# Patient Record
Sex: FEMALE | Race: WHITE | NOT HISPANIC OR LATINO | Employment: OTHER | URBAN - NONMETROPOLITAN AREA
[De-identification: names, ages, dates, MRNs, and addresses within clinical notes are randomized per-mention and may not be internally consistent; named-entity substitution may affect disease eponyms.]

---

## 2024-02-12 ENCOUNTER — APPOINTMENT (OUTPATIENT)
Dept: GENERAL RADIOLOGY | Facility: HOSPITAL | Age: 69
End: 2024-02-12
Payer: COMMERCIAL

## 2024-02-12 ENCOUNTER — HOSPITAL ENCOUNTER (EMERGENCY)
Facility: HOSPITAL | Age: 69
Discharge: HOME | End: 2024-02-12
Attending: STUDENT IN AN ORGANIZED HEALTH CARE EDUCATION/TRAINING PROGRAM | Admitting: STUDENT IN AN ORGANIZED HEALTH CARE EDUCATION/TRAINING PROGRAM
Payer: COMMERCIAL

## 2024-02-12 VITALS
BODY MASS INDEX: 30.36 KG/M2 | RESPIRATION RATE: 17 BRPM | WEIGHT: 165 LBS | HEART RATE: 75 BPM | SYSTOLIC BLOOD PRESSURE: 140 MMHG | DIASTOLIC BLOOD PRESSURE: 88 MMHG | OXYGEN SATURATION: 98 % | HEIGHT: 62 IN | TEMPERATURE: 97.8 F

## 2024-02-12 DIAGNOSIS — I10 HYPERTENSION, UNSPECIFIED TYPE: Primary | ICD-10-CM

## 2024-02-12 DIAGNOSIS — E03.9 HYPOTHYROIDISM, UNSPECIFIED TYPE: ICD-10-CM

## 2024-02-12 LAB
ALBUMIN SERPL-MCNC: 4.6 G/DL (ref 3.5–5.2)
ALBUMIN/GLOB SERPL: 1.3 G/DL
ALP SERPL-CCNC: 78 U/L (ref 39–117)
ALT SERPL W P-5'-P-CCNC: 20 U/L (ref 1–33)
ANION GAP SERPL CALCULATED.3IONS-SCNC: 22 MMOL/L (ref 5–15)
AST SERPL-CCNC: 27 U/L (ref 1–32)
BASOPHILS # BLD AUTO: 0.04 10*3/MM3 (ref 0–0.2)
BASOPHILS NFR BLD AUTO: 0.5 % (ref 0–1.5)
BILIRUB SERPL-MCNC: 0.7 MG/DL (ref 0–1.2)
BUN SERPL-MCNC: 13 MG/DL (ref 8–23)
BUN/CREAT SERPL: 12.4 (ref 7–25)
CALCIUM SPEC-SCNC: 9.7 MG/DL (ref 8.6–10.5)
CHLORIDE SERPL-SCNC: 97 MMOL/L (ref 98–107)
CO2 SERPL-SCNC: 19 MMOL/L (ref 22–29)
CREAT SERPL-MCNC: 1.05 MG/DL (ref 0.57–1)
DEPRECATED RDW RBC AUTO: 49.9 FL (ref 37–54)
EGFRCR SERPLBLD CKD-EPI 2021: 57.3 ML/MIN/1.73
EOSINOPHIL # BLD AUTO: 0.06 10*3/MM3 (ref 0–0.4)
EOSINOPHIL NFR BLD AUTO: 0.8 % (ref 0.3–6.2)
ERYTHROCYTE [DISTWIDTH] IN BLOOD BY AUTOMATED COUNT: 14.7 % (ref 12.3–15.4)
GEN 5 2HR TROPONIN T REFLEX: 11 NG/L
GLOBULIN UR ELPH-MCNC: 3.5 GM/DL
GLUCOSE SERPL-MCNC: 93 MG/DL (ref 65–99)
HCT VFR BLD AUTO: 44 % (ref 34–46.6)
HGB BLD-MCNC: 14.6 G/DL (ref 12–15.9)
HOLD SPECIMEN: NORMAL
HOLD SPECIMEN: NORMAL
IMM GRANULOCYTES # BLD AUTO: 0.02 10*3/MM3 (ref 0–0.05)
IMM GRANULOCYTES NFR BLD AUTO: 0.3 % (ref 0–0.5)
LYMPHOCYTES # BLD AUTO: 1.36 10*3/MM3 (ref 0.7–3.1)
LYMPHOCYTES NFR BLD AUTO: 17.3 % (ref 19.6–45.3)
MCH RBC QN AUTO: 30.4 PG (ref 26.6–33)
MCHC RBC AUTO-ENTMCNC: 33.2 G/DL (ref 31.5–35.7)
MCV RBC AUTO: 91.5 FL (ref 79–97)
MONOCYTES # BLD AUTO: 0.67 10*3/MM3 (ref 0.1–0.9)
MONOCYTES NFR BLD AUTO: 8.5 % (ref 5–12)
NEUTROPHILS NFR BLD AUTO: 5.73 10*3/MM3 (ref 1.7–7)
NEUTROPHILS NFR BLD AUTO: 72.6 % (ref 42.7–76)
NRBC BLD AUTO-RTO: 0 /100 WBC (ref 0–0.2)
PLATELET # BLD AUTO: 370 10*3/MM3 (ref 140–450)
PMV BLD AUTO: 9.5 FL (ref 6–12)
POTASSIUM SERPL-SCNC: 4.2 MMOL/L (ref 3.5–5.2)
PROT SERPL-MCNC: 8.1 G/DL (ref 6–8.5)
RBC # BLD AUTO: 4.81 10*6/MM3 (ref 3.77–5.28)
SODIUM SERPL-SCNC: 138 MMOL/L (ref 136–145)
T4 FREE SERPL-MCNC: 2.28 NG/DL (ref 0.93–1.7)
TROPONIN T DELTA: 0 NG/L
TROPONIN T SERPL HS-MCNC: 11 NG/L
TSH SERPL DL<=0.05 MIU/L-ACNC: 4.32 UIU/ML (ref 0.27–4.2)
WBC NRBC COR # BLD AUTO: 7.88 10*3/MM3 (ref 3.4–10.8)
WHOLE BLOOD HOLD COAG: NORMAL
WHOLE BLOOD HOLD SPECIMEN: NORMAL

## 2024-02-12 PROCEDURE — 80053 COMPREHEN METABOLIC PANEL: CPT | Performed by: STUDENT IN AN ORGANIZED HEALTH CARE EDUCATION/TRAINING PROGRAM

## 2024-02-12 PROCEDURE — 36415 COLL VENOUS BLD VENIPUNCTURE: CPT

## 2024-02-12 PROCEDURE — 93005 ELECTROCARDIOGRAM TRACING: CPT | Performed by: STUDENT IN AN ORGANIZED HEALTH CARE EDUCATION/TRAINING PROGRAM

## 2024-02-12 PROCEDURE — 99284 EMERGENCY DEPT VISIT MOD MDM: CPT

## 2024-02-12 PROCEDURE — 84439 ASSAY OF FREE THYROXINE: CPT | Performed by: PHYSICIAN ASSISTANT

## 2024-02-12 PROCEDURE — 84484 ASSAY OF TROPONIN QUANT: CPT | Performed by: STUDENT IN AN ORGANIZED HEALTH CARE EDUCATION/TRAINING PROGRAM

## 2024-02-12 PROCEDURE — 71045 X-RAY EXAM CHEST 1 VIEW: CPT

## 2024-02-12 PROCEDURE — 85025 COMPLETE CBC W/AUTO DIFF WBC: CPT | Performed by: STUDENT IN AN ORGANIZED HEALTH CARE EDUCATION/TRAINING PROGRAM

## 2024-02-12 PROCEDURE — 71045 X-RAY EXAM CHEST 1 VIEW: CPT | Performed by: RADIOLOGY

## 2024-02-12 PROCEDURE — 84443 ASSAY THYROID STIM HORMONE: CPT | Performed by: PHYSICIAN ASSISTANT

## 2024-02-12 RX ORDER — CLONIDINE HYDROCHLORIDE 0.1 MG/1
0.1 TABLET ORAL EVERY 8 HOURS SCHEDULED
Status: DISCONTINUED | OUTPATIENT
Start: 2024-02-12 | End: 2024-02-12 | Stop reason: HOSPADM

## 2024-02-12 RX ORDER — SODIUM CHLORIDE 0.9 % (FLUSH) 0.9 %
10 SYRINGE (ML) INJECTION AS NEEDED
Status: DISCONTINUED | OUTPATIENT
Start: 2024-02-12 | End: 2024-02-12 | Stop reason: HOSPADM

## 2024-02-12 RX ORDER — CLONIDINE HYDROCHLORIDE 0.1 MG/1
0.1 TABLET ORAL ONCE
Status: COMPLETED | OUTPATIENT
Start: 2024-02-12 | End: 2024-02-12

## 2024-02-12 RX ORDER — ASPIRIN 325 MG
325 TABLET ORAL ONCE
Status: DISCONTINUED | OUTPATIENT
Start: 2024-02-12 | End: 2024-02-12 | Stop reason: HOSPADM

## 2024-02-12 RX ADMIN — CLONIDINE HYDROCHLORIDE 0.1 MG: 0.1 TABLET ORAL at 18:43

## 2024-02-13 LAB
QT INTERVAL: 362 MS
QTC INTERVAL: 433 MS

## 2024-02-16 ENCOUNTER — HOSPITAL ENCOUNTER (INPATIENT)
Facility: HOSPITAL | Age: 69
LOS: 4 days | Discharge: HOME | End: 2024-02-20
Attending: STUDENT IN AN ORGANIZED HEALTH CARE EDUCATION/TRAINING PROGRAM | Admitting: INTERNAL MEDICINE
Payer: COMMERCIAL

## 2024-02-16 ENCOUNTER — APPOINTMENT (OUTPATIENT)
Dept: CARDIOLOGY | Facility: HOSPITAL | Age: 69
End: 2024-02-16
Payer: COMMERCIAL

## 2024-02-16 DIAGNOSIS — I47.10 SVT (SUPRAVENTRICULAR TACHYCARDIA): Primary | ICD-10-CM

## 2024-02-16 LAB
ANION GAP SERPL CALCULATED.3IONS-SCNC: 16.2 MMOL/L (ref 5–15)
BASOPHILS # BLD AUTO: 0.06 10*3/MM3 (ref 0–0.2)
BASOPHILS NFR BLD AUTO: 0.6 % (ref 0–1.5)
BUN SERPL-MCNC: 15 MG/DL (ref 8–23)
BUN/CREAT SERPL: 17.2 (ref 7–25)
CALCIUM SPEC-SCNC: 10.3 MG/DL (ref 8.6–10.5)
CHLORIDE SERPL-SCNC: 97 MMOL/L (ref 98–107)
CO2 SERPL-SCNC: 23.8 MMOL/L (ref 22–29)
CREAT SERPL-MCNC: 0.87 MG/DL (ref 0.57–1)
DEPRECATED RDW RBC AUTO: 49 FL (ref 37–54)
EGFRCR SERPLBLD CKD-EPI 2021: 71.8 ML/MIN/1.73
EOSINOPHIL # BLD AUTO: 0.08 10*3/MM3 (ref 0–0.4)
EOSINOPHIL NFR BLD AUTO: 0.8 % (ref 0.3–6.2)
ERYTHROCYTE [DISTWIDTH] IN BLOOD BY AUTOMATED COUNT: 14.7 % (ref 12.3–15.4)
GEN 5 2HR TROPONIN T REFLEX: 11 NG/L
GLUCOSE SERPL-MCNC: 122 MG/DL (ref 65–99)
HCT VFR BLD AUTO: 46.1 % (ref 34–46.6)
HGB BLD-MCNC: 15.6 G/DL (ref 12–15.9)
HOLD SPECIMEN: NORMAL
HOLD SPECIMEN: NORMAL
IMM GRANULOCYTES # BLD AUTO: 0.02 10*3/MM3 (ref 0–0.05)
IMM GRANULOCYTES NFR BLD AUTO: 0.2 % (ref 0–0.5)
LYMPHOCYTES # BLD AUTO: 2.06 10*3/MM3 (ref 0.7–3.1)
LYMPHOCYTES NFR BLD AUTO: 21.3 % (ref 19.6–45.3)
MAGNESIUM SERPL-MCNC: 2.1 MG/DL (ref 1.6–2.4)
MCH RBC QN AUTO: 30.8 PG (ref 26.6–33)
MCHC RBC AUTO-ENTMCNC: 33.8 G/DL (ref 31.5–35.7)
MCV RBC AUTO: 90.9 FL (ref 79–97)
MONOCYTES # BLD AUTO: 0.93 10*3/MM3 (ref 0.1–0.9)
MONOCYTES NFR BLD AUTO: 9.6 % (ref 5–12)
NEUTROPHILS NFR BLD AUTO: 6.52 10*3/MM3 (ref 1.7–7)
NEUTROPHILS NFR BLD AUTO: 67.5 % (ref 42.7–76)
NRBC BLD AUTO-RTO: 0 /100 WBC (ref 0–0.2)
PHOSPHATE SERPL-MCNC: 2.7 MG/DL (ref 2.5–4.5)
PLATELET # BLD AUTO: 396 10*3/MM3 (ref 140–450)
PMV BLD AUTO: 10.7 FL (ref 6–12)
POTASSIUM SERPL-SCNC: 3.8 MMOL/L (ref 3.5–5.2)
QT INTERVAL: 304 MS
QTC INTERVAL: 489 MS
RBC # BLD AUTO: 5.07 10*6/MM3 (ref 3.77–5.28)
SODIUM SERPL-SCNC: 137 MMOL/L (ref 136–145)
T4 FREE SERPL-MCNC: 2.21 NG/DL (ref 0.93–1.7)
TROPONIN T DELTA: -2 NG/L
TROPONIN T SERPL HS-MCNC: 13 NG/L
TSH SERPL DL<=0.05 MIU/L-ACNC: 4.19 UIU/ML (ref 0.27–4.2)
WBC NRBC COR # BLD AUTO: 9.67 10*3/MM3 (ref 3.4–10.8)
WHOLE BLOOD HOLD COAG: NORMAL
WHOLE BLOOD HOLD SPECIMEN: NORMAL

## 2024-02-16 PROCEDURE — 84484 ASSAY OF TROPONIN QUANT: CPT | Performed by: INTERNAL MEDICINE

## 2024-02-16 PROCEDURE — 85025 COMPLETE CBC W/AUTO DIFF WBC: CPT | Performed by: STUDENT IN AN ORGANIZED HEALTH CARE EDUCATION/TRAINING PROGRAM

## 2024-02-16 PROCEDURE — 36415 COLL VENOUS BLD VENIPUNCTURE: CPT

## 2024-02-16 PROCEDURE — 99285 EMERGENCY DEPT VISIT HI MDM: CPT

## 2024-02-16 PROCEDURE — 83735 ASSAY OF MAGNESIUM: CPT | Performed by: INTERNAL MEDICINE

## 2024-02-16 PROCEDURE — 84439 ASSAY OF FREE THYROXINE: CPT | Performed by: STUDENT IN AN ORGANIZED HEALTH CARE EDUCATION/TRAINING PROGRAM

## 2024-02-16 PROCEDURE — 84443 ASSAY THYROID STIM HORMONE: CPT | Performed by: STUDENT IN AN ORGANIZED HEALTH CARE EDUCATION/TRAINING PROGRAM

## 2024-02-16 PROCEDURE — 84100 ASSAY OF PHOSPHORUS: CPT | Performed by: INTERNAL MEDICINE

## 2024-02-16 PROCEDURE — 99223 1ST HOSP IP/OBS HIGH 75: CPT

## 2024-02-16 PROCEDURE — 80048 BASIC METABOLIC PNL TOTAL CA: CPT | Performed by: STUDENT IN AN ORGANIZED HEALTH CARE EDUCATION/TRAINING PROGRAM

## 2024-02-16 PROCEDURE — G0378 HOSPITAL OBSERVATION PER HR: HCPCS

## 2024-02-16 PROCEDURE — 93005 ELECTROCARDIOGRAM TRACING: CPT | Performed by: STUDENT IN AN ORGANIZED HEALTH CARE EDUCATION/TRAINING PROGRAM

## 2024-02-16 PROCEDURE — 25810000003 LACTATED RINGERS SOLUTION: Performed by: STUDENT IN AN ORGANIZED HEALTH CARE EDUCATION/TRAINING PROGRAM

## 2024-02-16 PROCEDURE — 93010 ELECTROCARDIOGRAM REPORT: CPT | Performed by: INTERNAL MEDICINE

## 2024-02-16 RX ORDER — POLYETHYLENE GLYCOL 3350 17 G/17G
17 POWDER, FOR SOLUTION ORAL DAILY PRN
Status: DISCONTINUED | OUTPATIENT
Start: 2024-02-16 | End: 2024-02-20 | Stop reason: HOSPADM

## 2024-02-16 RX ORDER — SODIUM CHLORIDE 9 MG/ML
40 INJECTION, SOLUTION INTRAVENOUS AS NEEDED
Status: DISCONTINUED | OUTPATIENT
Start: 2024-02-16 | End: 2024-02-20 | Stop reason: HOSPADM

## 2024-02-16 RX ORDER — AMOXICILLIN 250 MG
2 CAPSULE ORAL 2 TIMES DAILY PRN
Status: DISCONTINUED | OUTPATIENT
Start: 2024-02-16 | End: 2024-02-20 | Stop reason: HOSPADM

## 2024-02-16 RX ORDER — NITROGLYCERIN 0.4 MG/1
0.4 TABLET SUBLINGUAL
Status: DISCONTINUED | OUTPATIENT
Start: 2024-02-16 | End: 2024-02-20 | Stop reason: HOSPADM

## 2024-02-16 RX ORDER — SODIUM CHLORIDE 0.9 % (FLUSH) 0.9 %
10 SYRINGE (ML) INJECTION AS NEEDED
Status: DISCONTINUED | OUTPATIENT
Start: 2024-02-16 | End: 2024-02-20 | Stop reason: HOSPADM

## 2024-02-16 RX ORDER — METOPROLOL TARTRATE 1 MG/ML
5 INJECTION, SOLUTION INTRAVENOUS ONCE
Status: COMPLETED | OUTPATIENT
Start: 2024-02-16 | End: 2024-02-16

## 2024-02-16 RX ORDER — ACETAMINOPHEN 325 MG/1
650 TABLET ORAL EVERY 4 HOURS PRN
Status: DISCONTINUED | OUTPATIENT
Start: 2024-02-16 | End: 2024-02-20 | Stop reason: HOSPADM

## 2024-02-16 RX ORDER — BISACODYL 5 MG/1
5 TABLET, DELAYED RELEASE ORAL DAILY PRN
Status: DISCONTINUED | OUTPATIENT
Start: 2024-02-16 | End: 2024-02-20 | Stop reason: HOSPADM

## 2024-02-16 RX ORDER — LEVOTHYROXINE SODIUM 0.07 MG/1
75 TABLET ORAL
Status: DISCONTINUED | OUTPATIENT
Start: 2024-02-17 | End: 2024-02-20 | Stop reason: HOSPADM

## 2024-02-16 RX ORDER — LEVOTHYROXINE SODIUM 0.1 MG/1
100 TABLET ORAL DAILY
COMMUNITY
End: 2024-02-20 | Stop reason: HOSPADM

## 2024-02-16 RX ORDER — CALCIUM CARBONATE 500 MG/1
2 TABLET, CHEWABLE ORAL 2 TIMES DAILY PRN
Status: DISCONTINUED | OUTPATIENT
Start: 2024-02-16 | End: 2024-02-20 | Stop reason: HOSPADM

## 2024-02-16 RX ORDER — ENOXAPARIN SODIUM 100 MG/ML
40 INJECTION SUBCUTANEOUS DAILY
Status: DISCONTINUED | OUTPATIENT
Start: 2024-02-16 | End: 2024-02-20 | Stop reason: HOSPADM

## 2024-02-16 RX ORDER — BISACODYL 10 MG
10 SUPPOSITORY, RECTAL RECTAL DAILY PRN
Status: DISCONTINUED | OUTPATIENT
Start: 2024-02-16 | End: 2024-02-20 | Stop reason: HOSPADM

## 2024-02-16 RX ORDER — SODIUM CHLORIDE 0.9 % (FLUSH) 0.9 %
10 SYRINGE (ML) INJECTION EVERY 12 HOURS SCHEDULED
Status: DISCONTINUED | OUTPATIENT
Start: 2024-02-16 | End: 2024-02-20 | Stop reason: HOSPADM

## 2024-02-16 RX ORDER — DILTIAZEM HYDROCHLORIDE 5 MG/ML
10 INJECTION INTRAVENOUS ONCE
Status: COMPLETED | OUTPATIENT
Start: 2024-02-16 | End: 2024-02-16

## 2024-02-16 RX ADMIN — SODIUM CHLORIDE, POTASSIUM CHLORIDE, SODIUM LACTATE AND CALCIUM CHLORIDE 1000 ML: 600; 310; 30; 20 INJECTION, SOLUTION INTRAVENOUS at 12:09

## 2024-02-16 RX ADMIN — DILTIAZEM HYDROCHLORIDE 10 MG: 5 INJECTION, SOLUTION INTRAVENOUS at 14:25

## 2024-02-16 RX ADMIN — SODIUM CHLORIDE 5 MG/HR: 900 INJECTION, SOLUTION INTRAVENOUS at 19:13

## 2024-02-16 RX ADMIN — METOPROLOL TARTRATE 5 MG: 1 INJECTION, SOLUTION INTRAVENOUS at 13:46

## 2024-02-16 RX ADMIN — METOPROLOL TARTRATE 5 MG: 1 INJECTION, SOLUTION INTRAVENOUS at 14:05

## 2024-02-16 NOTE — H&P
[facility] Medicine Services  History & Physical    Patient Identification:  Name:  Moriah Davies  Age:  68 y.o.  Sex:  female  :  1955  MRN:  [id number]   Visit Number:  [id number]  Admit Date: 2024   Primary Care Physician:  Provider, No Known    Subjective     Chief complaint: Rapid heart rate    History of presenting illness:      Moriah Davies is a 68 y.o. female who presented for further evaluation of rapid heart rate.  She was seen and examined in the ED in no acute distress but did appear uncomfortable.  Heart rate persistently mid 120s throughout exam.  She notes palpitations began about 3 weeks ago and have become increasingly more frequent and persistent.  She states she is consistently fatigued as well.  She denies any chest pain.  She is short of breath.  She states her cardiac history is only significant for an enlarged atrium though cardiologist she was seeing at the time stated he was not concerned about this and she did not follow-up.  She has had a stress test which she thinks was normal about 5 years ago.  She states she sees a naturopathic doctor currently.  Past medical history is significant for hypothyroidism.  She notes for the past year and a half she has been trying various herbal supplements to correct her thyroid dysfunction however given limited success she did call her old PCP about 3 weeks ago and was started on Synthroid at 100 mcg daily.  She denies starting any other new medications, stimulants or supplements.    Past medical history is significant for hypothyroidism    Upon arrival to the ED, vital signs were temp 98.2, heart rate 146, respirations 20, /118, SpO2 100%.  HS troponin negative x 1.  Glucose is elevated at 122.  TSH is WNL though free T4 is elevated at 2.21.  EKG was supraventricular tachycardia on arrival.    Known Emergency Department medications received prior to my evaluation included 10 mg Cardizem, 1 L LR bolus, 5 mg  IV Lopressor x 2.   Emergency Department Room location at the time of my evaluation was 102.     ---------------------------------------------------------------------------------------------------------------------   Review of Systems   Constitutional:  Positive for fatigue. Negative for chills and fever.   HENT:  Negative for congestion and rhinorrhea.    Respiratory:  Positive for shortness of breath. Negative for cough.    Cardiovascular:  Positive for palpitations. Negative for chest pain.   Gastrointestinal:  Negative for abdominal distention, diarrhea, nausea and vomiting.   Genitourinary:  Negative for difficulty urinating and dysuria.   Musculoskeletal:  Negative for arthralgias and myalgias.   Skin:  Negative for rash and wound.   Neurological:  Negative for dizziness and light-headedness.        ---------------------------------------------------------------------------------------------------------------------   No past medical history on file.  No past surgical history on file.  No family history on file.  Social History     Socioeconomic History    Marital status:      ---------------------------------------------------------------------------------------------------------------------   Allergies:  Patient has no known allergies.  ---------------------------------------------------------------------------------------------------------------------   Home medications:    Medications below are reported home medications pulling from within the system; at this time, these medications have not been reconciled unless otherwise specified and are in the verification process for further verifcation as current home medications.  (Not in a hospital admission)      Hospital Scheduled Meds:    No current facility-administered medications for this encounter.      Current listed hospital scheduled medications may not yet reflect those currently placed in orders that are signed and held awaiting patient's arrival  to floor.   ---------------------------------------------------------------------------------------------------------------------     Objective     Vital Signs:  Temp:  [98.2 °F (36.8 °C)] 98.2 °F (36.8 °C)  Heart Rate:  [] 126  Resp:  [20] 20  BP: (122-175)/() 140/93      02/16/24  1125   Weight: 74.8 kg (165 lb)     Body mass index is 30.18 kg/m².  ---------------------------------------------------------------------------------------------------------------------       Physical Exam  Vitals and nursing note reviewed.   Constitutional:       General: She is not in acute distress.     Appearance: She is normal weight.   HENT:      Head: Normocephalic and atraumatic.   Eyes:      Extraocular Movements: Extraocular movements intact.      Conjunctiva/sclera: Conjunctivae normal.   Cardiovascular:      Rate and Rhythm: Regular rhythm. Tachycardia present.   Pulmonary:      Effort: Pulmonary effort is normal.      Breath sounds: Normal breath sounds.   Abdominal:      Palpations: Abdomen is soft.   Musculoskeletal:      Right lower leg: No edema.      Left lower leg: No edema.   Skin:     General: Skin is warm and dry.   Neurological:      Mental Status: She is alert. Mental status is at baseline.   Psychiatric:         Mood and Affect: Mood normal.         Behavior: Behavior normal.             ---------------------------------------------------------------------------------------------------------------------  EKG:        I have personally looked at the EKG.  ---------------------------------------------------------------------------------------------------------------------   Results from last 7 days   Lab Units 02/16/24  1134 02/12/24  1637   WBC 10*3/mm3 9.67 7.88   HEMOGLOBIN g/dL 15.6 14.6   HEMATOCRIT % 46.1 44.0   MCV fL 90.9 91.5   MCHC g/dL 33.8 33.2   PLATELETS 10*3/mm3 396 370         Results from last 7 days   Lab Units 02/16/24  1134 02/12/24  1637   SODIUM mmol/L 137 138   POTASSIUM mmol/L  "3.8 4.2   MAGNESIUM mg/dL 2.1  --    CHLORIDE mmol/L 97* 97*   CO2 mmol/L 23.8 19.0*   BUN mg/dL 15 13   CREATININE mg/dL 0.87 1.05*   CALCIUM mg/dL 10.3 9.7   GLUCOSE mg/dL 122* 93   ALBUMIN g/dL  --  4.6   BILIRUBIN mg/dL  --  0.7   ALK PHOS U/L  --  78   AST (SGOT) U/L  --  27   ALT (SGPT) U/L  --  20   Estimated Creatinine Clearance: 57 mL/min (by C-G formula based on SCr of 0.87 mg/dL).  No results found for: \"AMMONIA\"  Results from last 7 days   Lab Units 02/16/24  1134 02/12/24  1846 02/12/24  1637   HSTROP T ng/L 13 11 11         No results found for: \"HGBA1C\"  Lab Results   Component Value Date    TSH 4.190 02/16/2024    FREET4 2.21 (H) 02/16/2024     No results found for: \"PREGTESTUR\", \"PREGSERUM\", \"HCG\", \"HCGQUANT\"  Pain Management Panel           No data to display              No results found for: \"BLOODCX\"  No results found for: \"URINECX\"  No results found for: \"WOUNDCX\"  No results found for: \"STOOLCX\"      ---------------------------------------------------------------------------------------------------------------------  Imaging Results (Last 7 Days)       ** No results found for the last 168 hours. **            Cultures:  No results found for: \"BLOODCX\", \"URINECX\", \"WOUNDCX\", \"MRSACX\", \"RESPCX\", \"STOOLCX\"    Last echocardiogram:          I have personally reviewed the above radiology images and read the final radiology report on 02/16/24  ---------------------------------------------------------------------------------------------------------------------  Assessment / Plan     Active Hospital Problems    Diagnosis  POA    **SVT (supraventricular tachycardia) [I47.10]  Yes       ASSESSMENT/PLAN:    Supraventricular tachycardia, POA  Hypothyroidism presenting with elevated free T4, likely contributing to above  Patient presents secondary to rapid heart rate, found to be in SVT on arrival rates as high as 158.  She is s/p 10 mg of IV Cardizem, 5 mg IV Lopressor x 2 and currently maintaining rates " mid 120s.  Will admit to the telemetry unit for further workup and management  Consult cardiology for further assistance and recommendations, appreciated  Given sustaining tachycardia will start diltiazem infusion at 5 mg/h and titrate as needed  TTE ordered and pending  Monitor closely on telemetry  Plan to reduce Synthroid dose once med rec is available.  Will need follow-up labs 4 to 6 weeks.  Supportive care and follow-up further cardiology recommendations    ----------  -DVT prophylaxis: Lovenox  -Activity: Ad jane.  -Expected length of stay: Less than 2 midnights  -Disposition pending course and further workup    High risk secondary to SVT    Code Status and Medical Interventions:   Ordered at: 02/16/24 1556     Code Status (Patient has no pulse and is not breathing):    CPR (Attempt to Resuscitate)     Medical Interventions (Patient has pulse or is breathing):    Full Support       Bon Dawkins PA-C   02/16/24  16:49 EST

## 2024-02-16 NOTE — ED NOTES
Pt states she stopped taking Levothyroxine for 3 months because she [place] from [place] and couldn't get a prescription. Pt states she finally started taking it again and has been having episodes of tachycardia since. Pt states they have been lasting longer. Pt states she is not on any other medications other than 100mcg of levothyroxine.

## 2024-02-16 NOTE — ED PROVIDER NOTES
Subjective   History of Present Illness    68-year-old female with past medical history of hypothyroidism, hypertension presenting for elevated heart rate and palpitations.  Patient states that she has been having episodes ongoing for the last couple weeks.  States that episodes initially just seconds at a time but have gotten progressively more frequent and longer duration.  States that over the last few days episodes have lasted hours on a half of the time and is having multiple episodes a day including at night.  Denies any chest pain.  Does report some shortness of breath but only during the episodes.  No nausea or vomiting.  States that she was having some diarrhea but this was in the setting of a bowel cleanse.  Patient states that she had also been out of her thyroid medication for several months and then recently restarted the medication approximately 2 weeks ago.    Review of Systems    No past medical history on file.    No Known Allergies    No past surgical history on file.    No family history on file.    Social History     Socioeconomic History    Marital status:            Objective   Physical Exam  Vitals and nursing note reviewed.   Constitutional:       General: She is not in acute distress.  HENT:      Head: Normocephalic.      Mouth/Throat:      Mouth: Mucous membranes are moist.   Cardiovascular:      Rate and Rhythm: Normal rate and regular rhythm.      Pulses: Normal pulses.   Pulmonary:      Effort: Pulmonary effort is normal.      Breath sounds: Normal breath sounds.   Abdominal:      General: Abdomen is flat. There is no distension.      Palpations: Abdomen is soft.      Tenderness: There is no abdominal tenderness.   Musculoskeletal:      Cervical back: Normal range of motion and neck supple.   Skin:     General: Skin is warm and dry.   Neurological:      Mental Status: She is alert.         Procedures           ED Course                                             Medical Decision  Making  Problems Addressed:  SVT (supraventricular tachycardia): complicated acute illness or injury    Amount and/or Complexity of Data Reviewed  Labs: ordered.  ECG/medicine tests: ordered.    Risk  Prescription drug management.  Decision regarding hospitalization.      Initial EKG with sinus tach vs SVT. Second EKG appears to be more consistent with SVT.  Patient spontaneously converted while in the emergency department.  However, patient went back into SVT.  Patient was given 2 doses of Lopressor without effect.  Given dose of IV Cardizem with improvement in heart rate and conversion back to normal sinus rhythm.  Given multiple episodes of SVT feel patient would benefit from admission to hospital for further observation and management.    Final diagnoses:   SVT (supraventricular tachycardia)       ED Disposition  ED Disposition       ED Disposition   Decision to Admit    Condition   --    Comment   Level of Care: Telemetry [5]   Diagnosis: SVT (supraventricular tachycardia) [202906]   Admitting Physician: MAGALY GUILLAUME [[id number]]                 No follow-up provider specified.       Medication List      No changes were made to your prescriptions during this visit.            Kirstin Alex MD  02/16/24 7843

## 2024-02-16 NOTE — PLAN OF CARE
Goal Outcome Evaluation:  Plan of Care Reviewed With: patient        Progress: improving       Patient admitted from ER this shift. Patient HR currently SR 80's, Mojgan, DO aware. Cardizem held at this time. Patient has no complaints. No s/s of acute distress noted. Plan of care ongoing.

## 2024-02-17 ENCOUNTER — APPOINTMENT (OUTPATIENT)
Dept: CARDIOLOGY | Facility: HOSPITAL | Age: 69
End: 2024-02-17
Payer: COMMERCIAL

## 2024-02-17 LAB
ALBUMIN SERPL-MCNC: 4 G/DL (ref 3.5–5.2)
ALBUMIN/GLOB SERPL: 1.2 G/DL
ALP SERPL-CCNC: 62 U/L (ref 39–117)
ALT SERPL W P-5'-P-CCNC: 16 U/L (ref 1–33)
ANION GAP SERPL CALCULATED.3IONS-SCNC: 17 MMOL/L (ref 5–15)
AST SERPL-CCNC: 19 U/L (ref 1–32)
BASOPHILS # BLD AUTO: 0.05 10*3/MM3 (ref 0–0.2)
BASOPHILS NFR BLD AUTO: 0.6 % (ref 0–1.5)
BH CV ECHO MEAS - AO MAX PG: 5 MMHG
BH CV ECHO MEAS - AO MEAN PG: 3 MMHG
BH CV ECHO MEAS - AO ROOT DIAM: 2.9 CM
BH CV ECHO MEAS - AO V2 MAX: 112 CM/SEC
BH CV ECHO MEAS - AO V2 VTI: 21 CM
BH CV ECHO MEAS - EDV(CUBED): 45.9 ML
BH CV ECHO MEAS - EDV(MOD-SP4): 16.8 ML
BH CV ECHO MEAS - EF(MOD-SP4): 55.1 %
BH CV ECHO MEAS - ESV(CUBED): 22 ML
BH CV ECHO MEAS - ESV(MOD-SP4): 7.5 ML
BH CV ECHO MEAS - FS: 21.8 %
BH CV ECHO MEAS - IVS/LVPW: 0.92 CM
BH CV ECHO MEAS - IVSD: 0.94 CM
BH CV ECHO MEAS - LA DIMENSION: 2.6 CM
BH CV ECHO MEAS - LAT PEAK E' VEL: 7.3 CM/SEC
BH CV ECHO MEAS - LV DIASTOLIC VOL/BSA (35-75): 9.5 CM2
BH CV ECHO MEAS - LV MASS(C)D: 103.9 GRAMS
BH CV ECHO MEAS - LV SYSTOLIC VOL/BSA (12-30): 4.3 CM2
BH CV ECHO MEAS - LVIDD: 3.6 CM
BH CV ECHO MEAS - LVIDS: 2.8 CM
BH CV ECHO MEAS - LVOT AREA: 2.8 CM2
BH CV ECHO MEAS - LVOT DIAM: 1.9 CM
BH CV ECHO MEAS - LVPWD: 1.02 CM
BH CV ECHO MEAS - MED PEAK E' VEL: 6.3 CM/SEC
BH CV ECHO MEAS - MV A MAX VEL: 44.7 CM/SEC
BH CV ECHO MEAS - MV E MAX VEL: 84.5 CM/SEC
BH CV ECHO MEAS - MV E/A: 1.89
BH CV ECHO MEAS - PA ACC TIME: 0.09 SEC
BH CV ECHO MEAS - RAP SYSTOLE: 10 MMHG
BH CV ECHO MEAS - RVSP: 38.5 MMHG
BH CV ECHO MEAS - SI(MOD-SP4): 5.2 ML/M2
BH CV ECHO MEAS - SV(MOD-SP4): 9.3 ML
BH CV ECHO MEAS - TAPSE (>1.6): 2.6 CM
BH CV ECHO MEAS - TR MAX PG: 28.5 MMHG
BH CV ECHO MEAS - TR MAX VEL: 267 CM/SEC
BH CV ECHO MEASUREMENTS AVERAGE E/E' RATIO: 12.43
BILIRUB SERPL-MCNC: 0.4 MG/DL (ref 0–1.2)
BUN SERPL-MCNC: 13 MG/DL (ref 8–23)
BUN/CREAT SERPL: 16.5 (ref 7–25)
CALCIUM SPEC-SCNC: 9.3 MG/DL (ref 8.6–10.5)
CHLORIDE SERPL-SCNC: 100 MMOL/L (ref 98–107)
CO2 SERPL-SCNC: 22 MMOL/L (ref 22–29)
CREAT SERPL-MCNC: 0.79 MG/DL (ref 0.57–1)
DEPRECATED RDW RBC AUTO: 49.6 FL (ref 37–54)
EGFRCR SERPLBLD CKD-EPI 2021: 80.6 ML/MIN/1.73
EOSINOPHIL # BLD AUTO: 0.16 10*3/MM3 (ref 0–0.4)
EOSINOPHIL NFR BLD AUTO: 1.8 % (ref 0.3–6.2)
ERYTHROCYTE [DISTWIDTH] IN BLOOD BY AUTOMATED COUNT: 14.6 % (ref 12.3–15.4)
GLOBULIN UR ELPH-MCNC: 3.3 GM/DL
GLUCOSE SERPL-MCNC: 113 MG/DL (ref 65–99)
HCT VFR BLD AUTO: 42 % (ref 34–46.6)
HGB BLD-MCNC: 13.8 G/DL (ref 12–15.9)
IMM GRANULOCYTES # BLD AUTO: 0.02 10*3/MM3 (ref 0–0.05)
IMM GRANULOCYTES NFR BLD AUTO: 0.2 % (ref 0–0.5)
LEFT ATRIUM VOLUME INDEX: 11.5 ML/M2
LYMPHOCYTES # BLD AUTO: 2.21 10*3/MM3 (ref 0.7–3.1)
LYMPHOCYTES NFR BLD AUTO: 25.5 % (ref 19.6–45.3)
MAGNESIUM SERPL-MCNC: 1.9 MG/DL (ref 1.6–2.4)
MCH RBC QN AUTO: 30.2 PG (ref 26.6–33)
MCHC RBC AUTO-ENTMCNC: 32.9 G/DL (ref 31.5–35.7)
MCV RBC AUTO: 91.9 FL (ref 79–97)
MONOCYTES # BLD AUTO: 0.91 10*3/MM3 (ref 0.1–0.9)
MONOCYTES NFR BLD AUTO: 10.5 % (ref 5–12)
NEUTROPHILS NFR BLD AUTO: 5.33 10*3/MM3 (ref 1.7–7)
NEUTROPHILS NFR BLD AUTO: 61.4 % (ref 42.7–76)
NRBC BLD AUTO-RTO: 0 /100 WBC (ref 0–0.2)
PLATELET # BLD AUTO: 342 10*3/MM3 (ref 140–450)
PMV BLD AUTO: 10.8 FL (ref 6–12)
POTASSIUM SERPL-SCNC: 3.6 MMOL/L (ref 3.5–5.2)
PROT SERPL-MCNC: 7.3 G/DL (ref 6–8.5)
QT INTERVAL: 290 MS
QT INTERVAL: 314 MS
QT INTERVAL: 394 MS
QTC INTERVAL: 425 MS
QTC INTERVAL: 449 MS
QTC INTERVAL: 470 MS
RBC # BLD AUTO: 4.57 10*6/MM3 (ref 3.77–5.28)
SODIUM SERPL-SCNC: 139 MMOL/L (ref 136–145)
WBC NRBC COR # BLD AUTO: 8.68 10*3/MM3 (ref 3.4–10.8)

## 2024-02-17 PROCEDURE — 99221 1ST HOSP IP/OBS SF/LOW 40: CPT | Performed by: NURSE PRACTITIONER

## 2024-02-17 PROCEDURE — 93010 ELECTROCARDIOGRAM REPORT: CPT | Performed by: INTERNAL MEDICINE

## 2024-02-17 PROCEDURE — 93306 TTE W/DOPPLER COMPLETE: CPT

## 2024-02-17 PROCEDURE — 93005 ELECTROCARDIOGRAM TRACING: CPT | Performed by: INTERNAL MEDICINE

## 2024-02-17 PROCEDURE — 93306 TTE W/DOPPLER COMPLETE: CPT | Performed by: INTERNAL MEDICINE

## 2024-02-17 PROCEDURE — 83735 ASSAY OF MAGNESIUM: CPT | Performed by: INTERNAL MEDICINE

## 2024-02-17 PROCEDURE — 80053 COMPREHEN METABOLIC PANEL: CPT | Performed by: INTERNAL MEDICINE

## 2024-02-17 PROCEDURE — 85025 COMPLETE CBC W/AUTO DIFF WBC: CPT | Performed by: INTERNAL MEDICINE

## 2024-02-17 PROCEDURE — 99232 SBSQ HOSP IP/OBS MODERATE 35: CPT | Performed by: INTERNAL MEDICINE

## 2024-02-17 PROCEDURE — 25010000002 ADENOSINE PER 6 MG: Performed by: INTERNAL MEDICINE

## 2024-02-17 RX ORDER — POTASSIUM CHLORIDE 20 MEQ/1
40 TABLET, EXTENDED RELEASE ORAL ONCE
Status: COMPLETED | OUTPATIENT
Start: 2024-02-17 | End: 2024-02-17

## 2024-02-17 RX ORDER — ADENOSINE 3 MG/ML
6 INJECTION, SOLUTION INTRAVENOUS ONCE
Status: COMPLETED | OUTPATIENT
Start: 2024-02-17 | End: 2024-02-17

## 2024-02-17 RX ORDER — HYDROXYZINE HYDROCHLORIDE 10 MG/1
10 TABLET, FILM COATED ORAL ONCE
Status: DISCONTINUED | OUTPATIENT
Start: 2024-02-17 | End: 2024-02-20 | Stop reason: HOSPADM

## 2024-02-17 RX ADMIN — LEVOTHYROXINE SODIUM 75 MCG: 0.07 TABLET ORAL at 06:10

## 2024-02-17 RX ADMIN — POTASSIUM CHLORIDE 40 MEQ: 1500 TABLET, EXTENDED RELEASE ORAL at 08:51

## 2024-02-17 RX ADMIN — SODIUM CHLORIDE 7.5 MG/HR: 900 INJECTION, SOLUTION INTRAVENOUS at 15:05

## 2024-02-17 RX ADMIN — ADENOSINE 6 MG: 3 INJECTION INTRAVENOUS at 12:36

## 2024-02-17 RX ADMIN — METOPROLOL TARTRATE 25 MG: 25 TABLET, FILM COATED ORAL at 06:10

## 2024-02-17 NOTE — PROGRESS NOTES
[facility] HOSPITALIST PROGRESS NOTE    Subjective     History:   Moriah Davies is a 68 y.o. female admitted on 2/16/2024 secondary to SVT (supraventricular tachycardia)     Procedures: None    CC: Follow up SVT    Patient seen and examined with JUANITA Mclain. Awake and alert. Reports palpitations. No reported CP or significant dyspnea. No reported vomiting. Episodes of intermittent tachycardia intermittently requiring Cardizem gtt. Pt given Adenosine per cards this afternoon. No acute events overnight per RN.     History taken from: patient, chart, and RN.      Objective     Vital Signs  Temp:  [98 °F (36.7 °C)-98.3 °F (36.8 °C)] 98.3 °F (36.8 °C)  Heart Rate:  [] 102  Resp:  [18-20] 18  BP: (114-142)/(69-95) 137/79  No intake or output data in the 24 hours ending 02/17/24 1459      Physical Exam:  General:    Awake, alert, in no acute distress   Heart:      Normal S1 and S2. Tachycardic. No significant murmur, rubs or gallops appreciated.   Lungs:     Respirations regular, even and unlabored. Lungs clear to auscultation B/L. No wheezes, rales or rhonchi.   Abdomen:   Soft and nontender. No guarding, rebound tenderness or  organomegaly noted. Bowel sounds present x 4.   Extremities:  No clubbing, cyanosis or edema noted. Moves UE and LE equally B/L.     Results Review:    Results from last 7 days   Lab Units 02/17/24  0038 02/16/24  1134 02/12/24  1637   WBC 10*3/mm3 8.68 9.67 7.88   HEMOGLOBIN g/dL 13.8 15.6 14.6   PLATELETS 10*3/mm3 342 396 370     Results from last 7 days   Lab Units 02/17/24  0038 02/16/24  1134 02/12/24  1637   SODIUM mmol/L 139 137 138   POTASSIUM mmol/L 3.6 3.8 4.2   CHLORIDE mmol/L 100 97* 97*   CO2 mmol/L 22.0 23.8 19.0*   BUN mg/dL 13 15 13   CREATININE mg/dL 0.79 0.87 1.05*   CALCIUM mg/dL 9.3 10.3 9.7   GLUCOSE mg/dL 113* 122* 93     Results from last 7 days   Lab Units 02/17/24  0038 02/12/24  1637   BILIRUBIN mg/dL 0.4 0.7   ALK PHOS U/L 62 78   AST (SGOT) U/L 19  27   ALT (SGPT) U/L 16 20     Results from last 7 days   Lab Units 02/17/24  0038 02/16/24  1134   MAGNESIUM mg/dL 1.9 2.1         Results from last 7 days   Lab Units 02/16/24  1804 02/16/24  1134 02/12/24  1846   HSTROP T ng/L 11 13 11       Imaging Results (Last 24 Hours)       ** No results found for the last 24 hours. **              Medications:  enoxaparin, 40 mg, Subcutaneous, Daily  hydrOXYzine, 10 mg, Oral, Once  levothyroxine, 75 mcg, Oral, Q AM  sodium chloride, 10 mL, Intravenous, Q12H      dilTIAZem, 7.5 mg/hr, Last Rate: Stopped (02/17/24 0116)            Assessment & Plan   Recurrent SVT: Troponin's negative. TSH 4.190 with free T4 mildly elevated at 2.21. K+ low normal and replacement ordered. Echo reveals EF of 56-60%, mld LVH and mild pulmonary HTN. S/P metoprolol in the ED. Intermittently requiring Cardizem gtt. S/P adenosine per cards today. Monitor on telemetry. Cardiology input appreciated.     Hypothyroidism: : Thyroid studies as above (pt states TSH on outpatient labs approx 1.5 weeks ago in the 20's). Reduced levothyroxine from 100 to 75mcg on admission. Will need repeat thyroid studies as an outpatient.     Borderline hypokalemia: Replacement ordered. Mg is 1.9. Cont to monitor.     DVT PPX: Lovenox ordered (pt has refused).    Disposition Likely home when medically stable.     Forest Ulrich DO  02/17/24  14:59 EST

## 2024-02-17 NOTE — PLAN OF CARE
Goal Outcome Evaluation:  Plan of Care Reviewed With: patient        Progress: improving  Outcome Evaluation: Pt is resting in bed at this time. Pts heart rate was originally 130s, cardizem started at 7.5. Heart rate more controlled and cardizem off provider aware see orders. Pt seems very aware of body and heart rate. Pt states she takes natural herb medication and is scare of taking new meds. Pt is currently NPO for consult. VSS afebrile. Plan of care ongoing.                 Problem: Adult Inpatient Plan of Care  Goal: Plan of Care Review  2/17/2024 0634 by Jaye Rizo RN  Outcome: Ongoing, Progressing  Flowsheets (Taken 2/17/2024 0634)  Progress: improving  Plan of Care Reviewed With: patient  Outcome Evaluation: Pt is resting in bed at this time. Pts heart rate was originally 130s, cardizem started at 7.5. Heart rate more controlled and cardizem off provider aware see orders. Pt seems very aware of body and heart rate. Pt states she takes natural herb medication and is scare of taking new meds. Pt is currently NPO for consult. VSS afebrile. Plan of care ongoing.  2/17/2024 0409 by Jaye Rizo RN  Outcome: Ongoing, Progressing  Flowsheets (Taken 2/16/2024 1840 by Teresa Walter, RN)  Plan of Care Reviewed With: patient  Goal: Patient-Specific Goal (Individualized)  2/17/2024 0634 by Jaye Rizo RN  Outcome: Ongoing, Progressing  2/17/2024 0409 by Jaye Rizo RN  Outcome: Ongoing, Progressing  Goal: Absence of Hospital-Acquired Illness or Injury  2/17/2024 0634 by Jaye Rizo RN  Outcome: Ongoing, Progressing  2/17/2024 0409 by Jaye Rizo RN  Outcome: Ongoing, Progressing  Intervention: Identify and Manage Fall Risk  Recent Flowsheet Documentation  Taken 2/17/2024 0300 by Jaye Rizo RN  Safety Promotion/Fall Prevention:   activity supervised   assistive device/personal items within reach   clutter free environment maintained   fall prevention program maintained   nonskid  shoes/slippers when out of bed   safety round/check completed   room organization consistent  Taken 2/17/2024 0100 by Jaye Rizo RN  Safety Promotion/Fall Prevention:   activity supervised   assistive device/personal items within reach   clutter free environment maintained   fall prevention program maintained   nonskid shoes/slippers when out of bed   safety round/check completed   room organization consistent  Taken 2/16/2024 2300 by Jaye Rizo RN  Safety Promotion/Fall Prevention:   activity supervised   assistive device/personal items within reach   clutter free environment maintained   fall prevention program maintained   nonskid shoes/slippers when out of bed   safety round/check completed   room organization consistent  Taken 2/16/2024 2258 by Jaye Rizo RN  Safety Promotion/Fall Prevention:   activity supervised   assistive device/personal items within reach   clutter free environment maintained   fall prevention program maintained   nonskid shoes/slippers when out of bed   safety round/check completed   room organization consistent  Taken 2/16/2024 2100 by Jaye Rizo RN  Safety Promotion/Fall Prevention:   activity supervised   assistive device/personal items within reach   clutter free environment maintained   fall prevention program maintained   nonskid shoes/slippers when out of bed   safety round/check completed   room organization consistent  Taken 2/16/2024 1900 by Jaye Rizo RN  Safety Promotion/Fall Prevention:   activity supervised   assistive device/personal items within reach   clutter free environment maintained   fall prevention program maintained   nonskid shoes/slippers when out of bed   safety round/check completed   room organization consistent  Intervention: Prevent Skin Injury  Recent Flowsheet Documentation  Taken 2/16/2024 2258 by Jaye Rizo RN  Body Position: position changed independently  Skin Protection: adhesive use limited  Intervention: Prevent  and Manage VTE (Venous Thromboembolism) Risk  Recent Flowsheet Documentation  Taken 2/16/2024 2258 by Jaye Rizo RN  Activity Management: up ad jane  VTE Prevention/Management: patient refused intervention  Goal: Optimal Comfort and Wellbeing  2/17/2024 0634 by Jaye Rizo RN  Outcome: Ongoing, Progressing  2/17/2024 0409 by Jaye Rizo RN  Outcome: Ongoing, Progressing  Intervention: Provide Person-Centered Care  Recent Flowsheet Documentation  Taken 2/16/2024 2258 by Jaye Rizo RN  Trust Relationship/Rapport:   care explained   choices provided  Goal: Readiness for Transition of Care  2/17/2024 0634 by Jaye Rizo RN  Outcome: Ongoing, Progressing  2/17/2024 0409 by Jaye Rizo RN  Outcome: Ongoing, Progressing     Problem: Dysrhythmia  Goal: Normalized Cardiac Rhythm  2/17/2024 0634 by Jaye Rizo RN  Outcome: Ongoing, Progressing  2/17/2024 0409 by Jaye Rizo RN  Outcome: Ongoing, Progressing  Intervention: Monitor and Manage Cardiac Rhythm Effect  Recent Flowsheet Documentation  Taken 2/16/2024 2258 by Jaye Rizo RN  VTE Prevention/Management: patient refused intervention

## 2024-02-17 NOTE — CONSULTS
Date of Admit: 2/16/2024  Date of Consult: 02/17/24  Provider, No Known        SVT (supraventricular tachycardia)      Assessment      Supraventricular tachycardia, IV adenosine administered at bedside with patient converting to normal sinus rhythm  Hypothyroidism      Recommendations     Will resume IV Cardizem gtt        Reason for consultation: SVT    Subjective       Subjective       History of Present Illness  Moriah Davies is a 68-year-old female with a past medical history of hypothyroidism.  She presented to [facility] ED and was noted to be in paroxysmal SVT.  She was given IV Cardizem 10 mg and IV Lopressor 5 mg x 2. Troponin was normal.  She was following with a naturopathic provider and had not been taking any prescription medications.  She has been trying various herbal supplements but recently resumed Synthroid for her hypothyroidism.  She reports she had been feeling some palpitations which prompted her to present to the ED.  Cardiology was consulted for SVT.  Upon evaluation today the patient was in SVT.  She had previously been on Cardizem drip but this was held.  She reports she was feeling some dizziness and weakness.  Denies any chest pains or shortness of breath.    Cardiac risk factors:Negative    Last Echo: 2/17/2024    Left ventricular systolic function is normal. Left ventricular ejection fraction appears to be 56 - 60%.    Left ventricular wall thickness is consistent with mild concentric hypertrophy.    Estimated right ventricular systolic pressure from tricuspid regurgitation is mildly elevated (35-45 mmHg).    Mild pulmonary hypertension is present.    There is no evidence of pericardial effusion.      History reviewed. No pertinent past medical history.  History reviewed. No pertinent surgical history.  History reviewed. No pertinent family history.  Social History     Tobacco Use    Smoking status: Never    Smokeless tobacco: Never   Vaping Use    Vaping Use: Never used    Substance Use Topics    Alcohol use: Never    Drug use: Never     Medications Prior to Admission   Medication Sig Dispense Refill Last Dose    levothyroxine (SYNTHROID, LEVOTHROID) 100 MCG tablet Take 1 tablet by mouth Daily.   2/15/2024     Allergies:  Patient has no known allergies.    Review of Systems   Constitutional:  Negative for chills, fatigue and fever.   HENT:  Negative for congestion, ear pain, rhinorrhea and sore throat.    Respiratory:  Negative for cough, shortness of breath and wheezing.    Cardiovascular:  Positive for palpitations. Negative for chest pain and leg swelling.   Gastrointestinal:  Negative for abdominal pain, diarrhea, nausea and vomiting.   Genitourinary:  Negative for dysuria.   Musculoskeletal:  Negative for back pain and myalgias.   Skin:  Negative for rash and wound.   Neurological:  Positive for dizziness. Negative for light-headedness and headaches.   Psychiatric/Behavioral:  Negative for sleep disturbance. The patient is not nervous/anxious.        Objective       Objective      Vital Signs  Temp:  [98 °F (36.7 °C)-98.3 °F (36.8 °C)] 98.3 °F (36.8 °C)  Heart Rate:  [] 102  Resp:  [18-20] 18  BP: (114-142)/(69-95) 137/79  Vital Signs (last 72 hrs)         02/14 0700  02/15 0659 02/15 0700  02/16 0659 02/16 0700  02/17 0659 02/17 0700  02/17 1535   Most Recent      Temp (°F)     98 -  98.3      98.3     98.3 (36.8) 02/17 1147    Heart Rate     58 -  146      102     102 02/17 1147    Resp     18 -  20      18     18 02/17 1147    BP     114/69 -  175/118      137/79     137/79 02/17 1147    SpO2 (%)     95 -  100      97     97 02/17 1147          Body mass index is 30.67 kg/m².  Documented weights    02/16/24 1125 02/17/24 0500   Weight: 74.8 kg (165 lb) 76.1 kg (167 lb 11.2 oz)          No intake or output data in the 24 hours ending 02/17/24 1535  Physical Exam  Constitutional:       Appearance: Normal appearance. She is well-developed.   HENT:      Head: Normocephalic  "and atraumatic.   Cardiovascular:      Rate and Rhythm: Regular rhythm. Tachycardia present.      Heart sounds: Normal heart sounds.   Pulmonary:      Effort: Pulmonary effort is normal.      Breath sounds: Normal breath sounds.   Abdominal:      General: Bowel sounds are normal.      Palpations: Abdomen is soft.   Skin:     General: Skin is warm and dry.   Neurological:      General: No focal deficit present.      Mental Status: She is alert and oriented to person, place, and time.   Psychiatric:         Mood and Affect: Mood normal.         Behavior: Behavior normal.             Results review     Results Review:    I reviewed the patient's new clinical results.  Results from last 7 days   Lab Units 02/16/24  1804 02/16/24  1134 02/12/24  1846 02/12/24  1637   HSTROP T ng/L 11 13 11 11     Results from last 7 days   Lab Units 02/17/24  0038 02/16/24  1134 02/12/24  1637   WBC 10*3/mm3 8.68 9.67 7.88   HEMOGLOBIN g/dL 13.8 15.6 14.6   PLATELETS 10*3/mm3 342 396 370     Results from last 7 days   Lab Units 02/17/24  0038 02/16/24  1134 02/12/24  1637   SODIUM mmol/L 139 137 138   POTASSIUM mmol/L 3.6 3.8 4.2   CHLORIDE mmol/L 100 97* 97*   CO2 mmol/L 22.0 23.8 19.0*   BUN mg/dL 13 15 13   CREATININE mg/dL 0.79 0.87 1.05*   CALCIUM mg/dL 9.3 10.3 9.7   GLUCOSE mg/dL 113* 122* 93   ALT (SGPT) U/L 16  --  20   AST (SGOT) U/L 19  --  27     No results found for: \"INR\"  Lab Results   Component Value Date    MG 1.9 02/17/2024    MG 2.1 02/16/2024     Lab Results   Component Value Date    TSH 4.190 02/16/2024      No results found for: \"PROBNP\"    ECG 2/16/2024         ECG/EMG Results (last 24 hours)       Procedure Component Value Units Date/Time    SCANNED - TELEMETRY   [[id number]] Resulted: 02/16/24     Updated: 02/16/24 1825    SCANNED - TELEMETRY   [[id number]] Resulted: 02/16/24     Updated: 02/16/24 2116    ECG 12 Lead Chest Pain [[id number]] Collected: 02/17/24 0353     Updated: 02/17/24 0357     QT Interval 326 ms  "     QTC Interval 450 ms     Narrative:      Test Reason : Chest Pain  Blood Pressure :   */*   mmHG  Vent. Rate : 115 BPM     Atrial Rate : 115 BPM     P-R Int : 190 ms          QRS Dur :  84 ms      QT Int : 326 ms       P-R-T Axes :  94 -61  69 degrees     QTc Int : 450 ms    Sinus tachycardia with fusion complexes  Left axis deviation  Inferior infarct (cited on or before 16-FEB-2024)  Anteroseptal infarct (cited on or before 12-FEB-2024)  Abnormal ECG  When compared with ECG of 16-FEB-2024 16:23, (Unconfirmed)  fusion complexes are now present    Referred By: RONY           Confirmed By:     SCANNED - TELEMETRY   [[id number]] Resulted: 02/16/24     Updated: 02/17/24 0857    SCANNED - TELEMETRY   [[id number]] Resulted: 02/16/24     Updated: 02/17/24 0939    ECG 12 Lead Tachycardia [[id number]] Collected: 02/16/24 1120     Updated: 02/17/24 1008     QT Interval 290 ms      QTC Interval 470 ms     Narrative:      Test Reason : Tachycardia  Blood Pressure :   */*   mmHG  Vent. Rate : 158 BPM     Atrial Rate : 158 BPM     P-R Int : 118 ms          QRS Dur :  78 ms      QT Int : 290 ms       P-R-T Axes :   * -87  78 degrees     QTc Int : 470 ms    ** Critical Test Result: High HR  Sinus tachycardia  Left axis deviation  Inferior infarct , age undetermined  Anteroseptal infarct (cited on or before 12-FEB-2024)  Abnormal ECG  When compared with ECG of 12-FEB-2024 15:36,  Significant changes have occurred  Confirmed by Pratik Palumbo ([id number]) on 2/17/2024 10:07:53 AM    Referred By: HALE           Confirmed By: Pratik Palumbo    ECG 12 Lead Tachycardia [[id number]] Collected: 02/16/24 1246     Updated: 02/17/24 1008     QT Interval 394 ms      QTC Interval 425 ms     Narrative:      Test Reason : Tachycardia  Blood Pressure :   */*   mmHG  Vent. Rate :  70 BPM     Atrial Rate :  70 BPM     P-R Int : 208 ms          QRS Dur :  80 ms      QT Int : 394 ms       P-R-T Axes :  68 -33  54 degrees     QTc Int : 425 ms    Normal  sinus rhythm  Left axis deviation  Low voltage QRS  Cannot rule out Anteroseptal infarct (cited on or before 12-FEB-2024)  Abnormal ECG  When compared with ECG of 16-FEB-2024 12:29, (Unconfirmed)  premature ventricular complexes are no longer present  Vent. rate has decreased BY  86 BPM  Confirmed by Pratik Palumbo ([id number]) on 2/17/2024 10:07:59 AM    Referred By: HALE           Confirmed By: Pratik Palumbo    ECG 12 Lead Rhythm Change [[id number]] Collected: 02/16/24 1623     Updated: 02/17/24 1009     QT Interval 314 ms      QTC Interval 449 ms     Narrative:      Test Reason : Rhythm Change  Blood Pressure :   */*   mmHG  Vent. Rate : 123 BPM     Atrial Rate : 123 BPM     P-R Int : 184 ms          QRS Dur :  70 ms      QT Int : 314 ms       P-R-T Axes :  63 -62  73 degrees     QTc Int : 449 ms    Sinus tachycardia  Left axis deviation  Inferior infarct , age undetermined  Anteroseptal infarct (cited on or before 12-FEB-2024)  Abnormal ECG  When compared with ECG of 16-FEB-2024 12:46, (Unconfirmed)  Vent. rate has increased BY  53 BPM  Inferior infarct is now present  T wave inversion no longer evident in Anterior leads  Confirmed by Pratik Palumbo ([id number]) on 2/17/2024 10:08:34 AM    Referred By: HALE           Confirmed By: Pratik Palumbo    ECG 12 Lead Rhythm Change [[id number]] Collected: 02/17/24 1252     Updated: 02/17/24 1256     QT Interval 402 ms      QTC Interval 440 ms     Narrative:      Test Reason : Rhythm Change  Blood Pressure :   */*   mmHG  Vent. Rate :  72 BPM     Atrial Rate :  72 BPM     P-R Int : 194 ms          QRS Dur :  76 ms      QT Int : 402 ms       P-R-T Axes :  74 -33  69 degrees     QTc Int : 440 ms    Normal sinus rhythm  Left axis deviation  Anteroseptal infarct (cited on or before 12-FEB-2024)  Abnormal ECG  When compared with ECG of 17-FEB-2024 03:53, (Unconfirmed)  fusion complexes are no longer present  Vent. rate has decreased BY  43 BPM  Criteria for Inferior infarct are no longer  present  Nonspecific T wave abnormality now evident in Anterior leads    Referred By: KRISTIAN           Confirmed By:     Adult Transthoracic Echo Complete W/ Cont if Necessary Per Protocol [[id number]] Resulted: 02/17/24 1412     Updated: 02/17/24 1415     LVIDd 3.6 cm      LVIDs 2.8 cm      IVSd 0.94 cm      LVPWd 1.02 cm      FS 21.8 %      IVS/LVPW 0.92 cm      ESV(cubed) 22.0 ml      LV Sys Vol (BSA corrected) 4.3 cm2      EDV(cubed) 45.9 ml      LV Flores Vol (BSA corrected) 9.5 cm2      LV mass(C)d 103.9 grams      LVOT area 2.8 cm2      LVOT diam 1.90 cm      EDV(MOD-sp4) 16.8 ml      ESV(MOD-sp4) 7.5 ml      SV(MOD-sp4) 9.3 ml      SI(MOD-sp4) 5.2 ml/m2      EF(MOD-sp4) 55.1 %      MV E max yunior 84.5 cm/sec      MV A max yunior 44.7 cm/sec      MV E/A 1.89     LA ESV Index (BP) 11.5 ml/m2      Med Peak E' Yunior 6.3 cm/sec      Lat Peak E' Yunior 7.3 cm/sec      TR max yunior 267.0 cm/sec      Avg E/e' ratio 12.43     TAPSE (>1.6) 2.6 cm      LA dimension (2D)  2.6 cm      Ao pk yunior 112.0 cm/sec      Ao max PG 5.0 mmHg      Ao mean PG 3.0 mmHg      Ao V2 VTI 21.0 cm      TR max PG 28.5 mmHg      RVSP(TR) 38.5 mmHg      RAP systole 10.0 mmHg      PA acc time 0.09 sec      Ao root diam 2.9 cm     Narrative:        Left ventricular systolic function is normal. Left ventricular ejection   fraction appears to be 56 - 60%.    Left ventricular wall thickness is consistent with mild concentric   hypertrophy.    Estimated right ventricular systolic pressure from tricuspid   regurgitation is mildly elevated (35-45 mmHg).    Mild pulmonary hypertension is present.    There is no evidence of pericardial effusion.                  I have discussed my impression and recommendations with the patient and family.    Thank you very much for asking us to be involved in this patient's care.  We will follow along with you.    I have examined this patient with Dr. Dennys Major. Together, we have formed a plan of care for this patient.      Electronically signed by PANFILO Yee, 02/17/24, 3:41 PM EST.    Please note that portions of this note were completed with a voice recognition program.

## 2024-02-17 NOTE — PLAN OF CARE
Goal Outcome Evaluation:  Pt resting in bed with no s/s of distress.VSS. IV access maintained. Plan of care ongoing.

## 2024-02-18 LAB
ANION GAP SERPL CALCULATED.3IONS-SCNC: 15.3 MMOL/L (ref 5–15)
BASOPHILS # BLD AUTO: 0.06 10*3/MM3 (ref 0–0.2)
BASOPHILS NFR BLD AUTO: 0.4 % (ref 0–1.5)
BUN SERPL-MCNC: 14 MG/DL (ref 8–23)
BUN/CREAT SERPL: 17.1 (ref 7–25)
CALCIUM SPEC-SCNC: 9.4 MG/DL (ref 8.6–10.5)
CHLORIDE SERPL-SCNC: 98 MMOL/L (ref 98–107)
CO2 SERPL-SCNC: 22.7 MMOL/L (ref 22–29)
CREAT SERPL-MCNC: 0.82 MG/DL (ref 0.57–1)
DEPRECATED RDW RBC AUTO: 49.3 FL (ref 37–54)
EGFRCR SERPLBLD CKD-EPI 2021: 77.1 ML/MIN/1.73
EOSINOPHIL # BLD AUTO: 0.14 10*3/MM3 (ref 0–0.4)
EOSINOPHIL NFR BLD AUTO: 1 % (ref 0.3–6.2)
ERYTHROCYTE [DISTWIDTH] IN BLOOD BY AUTOMATED COUNT: 14.6 % (ref 12.3–15.4)
GLUCOSE SERPL-MCNC: 109 MG/DL (ref 65–99)
HCT VFR BLD AUTO: 42.2 % (ref 34–46.6)
HGB BLD-MCNC: 14.1 G/DL (ref 12–15.9)
IMM GRANULOCYTES # BLD AUTO: 0.07 10*3/MM3 (ref 0–0.05)
IMM GRANULOCYTES NFR BLD AUTO: 0.5 % (ref 0–0.5)
LYMPHOCYTES # BLD AUTO: 1.91 10*3/MM3 (ref 0.7–3.1)
LYMPHOCYTES NFR BLD AUTO: 14.1 % (ref 19.6–45.3)
MAGNESIUM SERPL-MCNC: 1.9 MG/DL (ref 1.6–2.4)
MCH RBC QN AUTO: 30.7 PG (ref 26.6–33)
MCHC RBC AUTO-ENTMCNC: 33.4 G/DL (ref 31.5–35.7)
MCV RBC AUTO: 91.9 FL (ref 79–97)
MONOCYTES # BLD AUTO: 1.35 10*3/MM3 (ref 0.1–0.9)
MONOCYTES NFR BLD AUTO: 10 % (ref 5–12)
NEUTROPHILS NFR BLD AUTO: 74 % (ref 42.7–76)
NEUTROPHILS NFR BLD AUTO: 9.97 10*3/MM3 (ref 1.7–7)
NRBC BLD AUTO-RTO: 0 /100 WBC (ref 0–0.2)
PLATELET # BLD AUTO: 331 10*3/MM3 (ref 140–450)
PMV BLD AUTO: 10.1 FL (ref 6–12)
POTASSIUM SERPL-SCNC: 3.9 MMOL/L (ref 3.5–5.2)
QT INTERVAL: 326 MS
QT INTERVAL: 402 MS
QTC INTERVAL: 440 MS
QTC INTERVAL: 450 MS
RBC # BLD AUTO: 4.59 10*6/MM3 (ref 3.77–5.28)
SODIUM SERPL-SCNC: 136 MMOL/L (ref 136–145)
WBC NRBC COR # BLD AUTO: 13.5 10*3/MM3 (ref 3.4–10.8)

## 2024-02-18 PROCEDURE — 80048 BASIC METABOLIC PNL TOTAL CA: CPT | Performed by: INTERNAL MEDICINE

## 2024-02-18 PROCEDURE — 85025 COMPLETE CBC W/AUTO DIFF WBC: CPT | Performed by: INTERNAL MEDICINE

## 2024-02-18 PROCEDURE — 99232 SBSQ HOSP IP/OBS MODERATE 35: CPT | Performed by: INTERNAL MEDICINE

## 2024-02-18 PROCEDURE — 99232 SBSQ HOSP IP/OBS MODERATE 35: CPT | Performed by: NURSE PRACTITIONER

## 2024-02-18 PROCEDURE — 83735 ASSAY OF MAGNESIUM: CPT | Performed by: INTERNAL MEDICINE

## 2024-02-18 RX ADMIN — SODIUM CHLORIDE 10 MG/HR: 900 INJECTION, SOLUTION INTRAVENOUS at 21:24

## 2024-02-18 RX ADMIN — SODIUM CHLORIDE 10 MG/HR: 900 INJECTION, SOLUTION INTRAVENOUS at 12:54

## 2024-02-18 RX ADMIN — METOPROLOL TARTRATE 25 MG: 25 TABLET, FILM COATED ORAL at 16:32

## 2024-02-18 RX ADMIN — Medication 10 ML: at 20:40

## 2024-02-18 NOTE — PLAN OF CARE
Goal Outcome Evaluation:  Plan of Care Reviewed With: patient        Progress: no change  Outcome Evaluation: PT asleep in bed at this time. PT remains A/Ox4 and on RA. PT has cardizem infusing at 10mg/hr. PT's HR is currently in the 70s. PT has had no complaints of pain or discomfort. PT does express some anxiety. Feelings/Thoughts acknowledged. PT has had no other concerns at this time. VSS. Afebrile. Will Continue Plan of Care.      Problem: Adult Inpatient Plan of Care  Goal: Plan of Care Review  Outcome: Ongoing, Progressing  Flowsheets (Taken 2/18/2024 0337)  Progress: no change  Plan of Care Reviewed With: patient  Outcome Evaluation: PT asleep in bed at this time. PT remains A/Ox4 and on RA. PT has cardizem infusing at 10mg/hr. PT's HR is currently in the 70s. PT has had no complaints of pain or discomfort. PT does express some anxiety. Feelings/Thoughts acknowledged. PT has had no other concerns at this time. VSS. Afebrile. Will Continue Plan of Care.  Goal: Patient-Specific Goal (Individualized)  Outcome: Ongoing, Progressing  Goal: Absence of Hospital-Acquired Illness or Injury  Outcome: Ongoing, Progressing  Intervention: Identify and Manage Fall Risk  Recent Flowsheet Documentation  Taken 2/18/2024 0300 by Jane Cueva, RN  Safety Promotion/Fall Prevention:   activity supervised   assistive device/personal items within reach   clutter free environment maintained   fall prevention program maintained   nonskid shoes/slippers when out of bed   safety round/check completed   room organization consistent  Taken 2/18/2024 0100 by Jane Cueva, RN  Safety Promotion/Fall Prevention:   activity supervised   assistive device/personal items within reach   clutter free environment maintained   fall prevention program maintained   nonskid shoes/slippers when out of bed   safety round/check completed   room organization consistent  Taken 2/17/2024 2300 by Jane Cueva, RN  Safety Promotion/Fall  Prevention:   activity supervised   assistive device/personal items within reach   clutter free environment maintained   fall prevention program maintained   nonskid shoes/slippers when out of bed   safety round/check completed   room organization consistent  Taken 2/17/2024 2100 by Jane Cueva RN  Safety Promotion/Fall Prevention:   activity supervised   assistive device/personal items within reach   clutter free environment maintained   fall prevention program maintained   nonskid shoes/slippers when out of bed   safety round/check completed   room organization consistent  Taken 2/17/2024 1900 by Jane Cueva RN  Safety Promotion/Fall Prevention:   activity supervised   assistive device/personal items within reach   clutter free environment maintained   fall prevention program maintained   nonskid shoes/slippers when out of bed   safety round/check completed   room organization consistent  Intervention: Prevent Skin Injury  Recent Flowsheet Documentation  Taken 2/17/2024 2300 by Jane Cueva RN  Body Position: position changed independently  Skin Protection:   drying agents applied   adhesive use limited   incontinence pads utilized  Intervention: Prevent and Manage VTE (Venous Thromboembolism) Risk  Recent Flowsheet Documentation  Taken 2/17/2024 2300 by Jane Cueva RN  Activity Management: up ad jane  VTE Prevention/Management: patient refused intervention  Intervention: Prevent Infection  Recent Flowsheet Documentation  Taken 2/18/2024 0300 by Jane Cueva RN  Infection Prevention: rest/sleep promoted  Taken 2/18/2024 0100 by Jane Cueva RN  Infection Prevention: rest/sleep promoted  Taken 2/17/2024 2300 by Jane Cueva RN  Infection Prevention: rest/sleep promoted  Taken 2/17/2024 2100 by Jane Cueva RN  Infection Prevention: rest/sleep promoted  Taken 2/17/2024 1900 by Jane Cueva RN  Infection Prevention: rest/sleep promoted  Goal: Optimal Comfort and  Wellbeing  Outcome: Ongoing, Progressing  Intervention: Provide Person-Centered Care  Recent Flowsheet Documentation  Taken 2/17/2024 2300 by Jane Cueva, RN  Trust Relationship/Rapport:   choices provided   care explained   empathic listening provided   questions answered   reassurance provided   thoughts/feelings acknowledged  Goal: Readiness for Transition of Care  Outcome: Ongoing, Progressing     Problem: Dysrhythmia  Goal: Normalized Cardiac Rhythm  Outcome: Ongoing, Progressing  Intervention: Monitor and Manage Cardiac Rhythm Effect  Recent Flowsheet Documentation  Taken 2/18/2024 0300 by Jane Cueva, RN  Stabilization Measures: airway opened  Taken 2/17/2024 2300 by Jane Cueva, RN  Stabilization Measures: airway opened  VTE Prevention/Management: patient refused intervention  Taken 2/17/2024 1900 by Jane Cueva, RN  Stabilization Measures: airway opened

## 2024-02-18 NOTE — PROGRESS NOTES
LOS: 1 day     Name: Moriah Davies  Age/Sex: 68 y.o. female  :  1955        PCP: Provider, No Known  REF: No Known Provider    Principal Problem:    SVT (supraventricular tachycardia)      Reason for follow-up: SVT    Subjective       Subjective     Moriah Davies is a 68-year-old female with a past medical history of hypothyroidism.  She presented to [facility] ED and was noted to be in paroxysmal SVT.  She was given IV Cardizem 10 mg and IV Lopressor 5 mg x 2. Troponin was normal.  She was following with a naturopathic provider and had not been taking any prescription medications.  She has been trying various herbal supplements but recently resumed Synthroid for her hypothyroidism.  She reports she had been feeling some palpitations which prompted her to present to the ED.  Cardiology was consulted for SVT.  IV adenosine was administered at bedside yesterday with patient converting to sinus rhythm.    Interval History: Patient is currently maintaining sinus rhythm in the 60s this morning.  She continues on the Cardizem drip.  Echocardiogram revealed an LVEF of 56 to 60% with mild LVH and mild pulmonary hypertension. She is feeling much better and was able to rest last night. She did go back into SVT for about 2 hours, heart rate was in the 160's.    ROS    Vital Signs  Temp:  [98.1 °F (36.7 °C)-98.6 °F (37 °C)] 98.6 °F (37 °C)  Heart Rate:  [] 77  Resp:  [16-20] 18  BP: (108-162)/(67-92) 120/68  Vital Signs (last 72 hrs)         02/15 0700  / 0659  0700  02/17 0659 /17 0700  /18 0659 18 0700  18 1202   Most Recent      Temp (°F)   98 -  98.3    98.1 -  98.6      98.6     98.6 (37)  1142    Heart Rate   58 -  146    70 -  144      77     77 /18 1142    Resp   18 -  20    16 -  20      18     18 18 1142    BP   114/69 -  175/118    108/67 -  162/84      120/68     120/68  1142    SpO2 (%)   95 -  100    95 -  97      98     98  1142       "    Documented weights    02/16/24 1125 02/17/24 0500 02/18/24 0500   Weight: 74.8 kg (165 lb) 76.1 kg (167 lb 11.2 oz) 75.5 kg (166 lb 8 oz)      Body mass index is 30.45 kg/m².    Intake/Output Summary (Last 24 hours) at 2/18/2024 1202  Last data filed at 2/18/2024 0800  Gross per 24 hour   Intake 360 ml   Output --   Net 360 ml     Objective:  Vital signs: (most recent): Blood pressure 120/68, pulse 77, temperature 98.6 °F (37 °C), temperature source Oral, resp. rate 18, height 157.5 cm (62\"), weight 75.5 kg (166 lb 8 oz), SpO2 98%.    Lungs:  Normal effort.  She is not in respiratory distress.    Heart: Normal rate.  Regular rhythm.    Abdomen: Abdomen is soft.  Bowel sounds are normal.     Neurological: Patient is alert.    Skin:  Warm.  No rash.               Objective       Physical Exam:  .  Physical Exam  Constitutional:       Appearance: Normal appearance. She is well-developed.   HENT:      Head: Normocephalic and atraumatic.   Cardiovascular:      Rate and Rhythm: Normal rate and regular rhythm.   Pulmonary:      Effort: Pulmonary effort is normal. No respiratory distress.   Abdominal:      General: Bowel sounds are normal.      Palpations: Abdomen is soft.   Skin:     General: Skin is warm.      Findings: No rash.   Neurological:      General: No focal deficit present.      Mental Status: She is alert and oriented to person, place, and time.   Psychiatric:         Mood and Affect: Mood normal.         Behavior: Behavior normal.               Procedures    Results review       Results Review:   Results from last 7 days   Lab Units 02/18/24  0052 02/17/24  0038 02/16/24  1134 02/12/24  1637   WBC 10*3/mm3 13.50* 8.68 9.67 7.88   HEMOGLOBIN g/dL 14.1 13.8 15.6 14.6   PLATELETS 10*3/mm3 331 342 396 370     Results from last 7 days   Lab Units 02/18/24  0052 02/17/24  0038 02/16/24  1134 02/12/24  1637   SODIUM mmol/L 136 139 137 138   POTASSIUM mmol/L 3.9 3.6 3.8 4.2   CHLORIDE mmol/L 98 100 97* 97*   CO2 " "mmol/L 22.7 22.0 23.8 19.0*   BUN mg/dL 14 13 15 13   CREATININE mg/dL 0.82 0.79 0.87 1.05*   CALCIUM mg/dL 9.4 9.3 10.3 9.7   GLUCOSE mg/dL 109* 113* 122* 93   ALT (SGPT) U/L  --  16  --  20   AST (SGOT) U/L  --  19  --  27     Results from last 7 days   Lab Units 02/16/24  1804 02/16/24  1134 02/12/24  1846 02/12/24  1637   HSTROP T ng/L 11 13 11 11     No results found for: \"INR\"  Lab Results   Component Value Date    MG 1.9 02/18/2024    MG 1.9 02/17/2024    MG 2.1 02/16/2024     Lab Results   Component Value Date    TSH 4.190 02/16/2024      Imaging Results (Last 48 Hours)       ** No results found for the last 48 hours. **          No results found for: \"BNP\"               Echo   Results for orders placed during the hospital encounter of 02/16/24    Adult Transthoracic Echo Complete W/ Cont if Necessary Per Protocol    Interpretation Summary    Left ventricular systolic function is normal. Left ventricular ejection fraction appears to be 56 - 60%.    Left ventricular wall thickness is consistent with mild concentric hypertrophy.    Estimated right ventricular systolic pressure from tricuspid regurgitation is mildly elevated (35-45 mmHg).    Mild pulmonary hypertension is present.    There is no evidence of pericardial effusion.           I reviewed the patient's new clinical results.    Telemetry: Sinus rhythm in the 60s       Medication Review:   enoxaparin, 40 mg, Subcutaneous, Daily  hydrOXYzine, 10 mg, Oral, Once  levothyroxine, 75 mcg, Oral, Q AM  sodium chloride, 10 mL, Intravenous, Q12H        dilTIAZem, 10 mg/hr, Last Rate: 10 mg/hr (02/17/24 2030)        Assessment      Assessment:  SVT with patient converting to sinus rhythm after IV adenosine administration yesterday but recurrent SVT yesterday evening  Hypothyroidism, TSH normal        Plan     Recommendations:  Continue with IV Cardizem today, will plan to transition to PO Cardizem tomorrow.  Start metoprolol tartrate 25 mg BID.  She will need " referral to EP as an outpatient for consideration of ablation.    I discussed the patient's findings and my recommendations with patient and family    I have seen this patient with Dr. Dennys Major. Together, we have formed a plan of care for this patient.     Electronically signed by PANFILO Yee, 02/18/24, 1:53 PM EST.    Please note that portions of this note were completed with a voice recognition program.

## 2024-02-18 NOTE — PLAN OF CARE
Goal Outcome Evaluation:  Pt resting in bed with no s/s of distress noted. VSS. IV access maintained. Plan of care ongoing.

## 2024-02-18 NOTE — PROGRESS NOTES
[facility] HOSPITALIST PROGRESS NOTE    Subjective     History:   Moriah Davies is a 68 y.o. female admitted on 2/16/2024 secondary to SVT (supraventricular tachycardia)     Procedures: None    CC: Follow up SVT    Patient seen and examined with JUANITA Mclain. Awake and alert with wife and family at bedside. States she feels better today after getting some sleep overnight. Palpitations overall improved. No reported CP or significant dyspnea. No reported vomiting. Remains on Cardizem gtt. No acute events overnight per RN.     History taken from: patient, chart, and RN.      Objective     Vital Signs  Temp:  [98.1 °F (36.7 °C)-98.6 °F (37 °C)] 98.2 °F (36.8 °C)  Heart Rate:  [] 69  Resp:  [16-20] 18  BP: (108-143)/(67-92) 113/67    Intake/Output Summary (Last 24 hours) at 2/18/2024 1631  Last data filed at 2/18/2024 1200  Gross per 24 hour   Intake 840 ml   Output --   Net 840 ml         Physical Exam:  General:    Awake, alert, in no acute distress   Heart:      Normal S1 and S2. Mildly tachycardic. No significant murmur, rubs or gallops appreciated.   Lungs:     Respirations regular, even and unlabored. Lungs clear to auscultation B/L. No wheezes, rales or rhonchi.   Abdomen:   Soft and nontender. No guarding, rebound tenderness or  organomegaly noted. Bowel sounds present x 4.   Extremities:  No clubbing, cyanosis or edema noted. Moves UE and LE equally B/L.     Results Review:    Results from last 7 days   Lab Units 02/18/24  0052 02/17/24  0038 02/16/24  1134 02/12/24  1637   WBC 10*3/mm3 13.50* 8.68 9.67 7.88   HEMOGLOBIN g/dL 14.1 13.8 15.6 14.6   PLATELETS 10*3/mm3 331 342 396 370     Results from last 7 days   Lab Units 02/18/24  0052 02/17/24  0038 02/16/24  1134 02/12/24  1637   SODIUM mmol/L 136 139 137 138   POTASSIUM mmol/L 3.9 3.6 3.8 4.2   CHLORIDE mmol/L 98 100 97* 97*   CO2 mmol/L 22.7 22.0 23.8 19.0*   BUN mg/dL 14 13 15 13   CREATININE mg/dL 0.82 0.79 0.87 1.05*   CALCIUM  mg/dL 9.4 9.3 10.3 9.7   GLUCOSE mg/dL 109* 113* 122* 93     Results from last 7 days   Lab Units 02/17/24  0038 02/12/24  1637   BILIRUBIN mg/dL 0.4 0.7   ALK PHOS U/L 62 78   AST (SGOT) U/L 19 27   ALT (SGPT) U/L 16 20     Results from last 7 days   Lab Units 02/18/24  0052 02/17/24  0038 02/16/24  1134   MAGNESIUM mg/dL 1.9 1.9 2.1         Results from last 7 days   Lab Units 02/16/24  1804 02/16/24  1134 02/12/24  1846   HSTROP T ng/L 11 13 11       Imaging Results (Last 24 Hours)       ** No results found for the last 24 hours. **              Medications:  enoxaparin, 40 mg, Subcutaneous, Daily  hydrOXYzine, 10 mg, Oral, Once  levothyroxine, 75 mcg, Oral, Q AM  metoprolol tartrate, 25 mg, Oral, Q12H  sodium chloride, 10 mL, Intravenous, Q12H      dilTIAZem, 10 mg/hr, Last Rate: 10 mg/hr (02/18/24 1254)            Assessment & Plan   Recurrent SVT: Troponin's negative. TSH 4.190 with free T4 mildly elevated at 2.21. K+ previously low normal and replacement ordered. Echo reveals EF of 56-60%, mld LVH and mild pulmonary HTN. S/P metoprolol in the ED. S/P adenosine per cards on 2/17. Currently on Cardizem gtt. Cards has added PO metoprolol today with plans for PO Cardizem tomorrow. Will need outpatient referral to EP for consideration of ablation. Monitor on telemetry. Cardiology input appreciated.     Hypothyroidism: : Thyroid studies as above (pt states TSH on outpatient labs approx 1.5 weeks ago in the 20's). Reduced levothyroxine from 100 to 75mcg on admission. Will need repeat thyroid studies as an outpatient.     Borderline hypokalemia: Improved today. Mg is 1.9. Cont to monitor.     DVT PPX: Lovenox ordered (pt has refused).    Discussed with Dr. Breaux.     Disposition Likely home when medically stable.     Forest Ulrich,   02/18/24  16:31 EST

## 2024-02-19 LAB
ALBUMIN SERPL-MCNC: 3.7 G/DL (ref 3.5–5.2)
ALBUMIN/GLOB SERPL: 1.2 G/DL
ALP SERPL-CCNC: 59 U/L (ref 39–117)
ALT SERPL W P-5'-P-CCNC: 12 U/L (ref 1–33)
ANION GAP SERPL CALCULATED.3IONS-SCNC: 12.4 MMOL/L (ref 5–15)
AST SERPL-CCNC: 16 U/L (ref 1–32)
BASOPHILS # BLD AUTO: 0.07 10*3/MM3 (ref 0–0.2)
BASOPHILS NFR BLD AUTO: 0.6 % (ref 0–1.5)
BILIRUB SERPL-MCNC: 0.5 MG/DL (ref 0–1.2)
BUN SERPL-MCNC: 21 MG/DL (ref 8–23)
BUN/CREAT SERPL: 19.3 (ref 7–25)
CALCIUM SPEC-SCNC: 9.2 MG/DL (ref 8.6–10.5)
CHLORIDE SERPL-SCNC: 101 MMOL/L (ref 98–107)
CO2 SERPL-SCNC: 22.6 MMOL/L (ref 22–29)
CREAT SERPL-MCNC: 1.09 MG/DL (ref 0.57–1)
DEPRECATED RDW RBC AUTO: 50.4 FL (ref 37–54)
EGFRCR SERPLBLD CKD-EPI 2021: 54.8 ML/MIN/1.73
EOSINOPHIL # BLD AUTO: 0.15 10*3/MM3 (ref 0–0.4)
EOSINOPHIL NFR BLD AUTO: 1.4 % (ref 0.3–6.2)
ERYTHROCYTE [DISTWIDTH] IN BLOOD BY AUTOMATED COUNT: 14.5 % (ref 12.3–15.4)
GLOBULIN UR ELPH-MCNC: 3.2 GM/DL
GLUCOSE SERPL-MCNC: 126 MG/DL (ref 65–99)
HCT VFR BLD AUTO: 40.6 % (ref 34–46.6)
HGB BLD-MCNC: 13.3 G/DL (ref 12–15.9)
IMM GRANULOCYTES # BLD AUTO: 0.04 10*3/MM3 (ref 0–0.05)
IMM GRANULOCYTES NFR BLD AUTO: 0.4 % (ref 0–0.5)
LYMPHOCYTES # BLD AUTO: 1.73 10*3/MM3 (ref 0.7–3.1)
LYMPHOCYTES NFR BLD AUTO: 15.7 % (ref 19.6–45.3)
MCH RBC QN AUTO: 30.6 PG (ref 26.6–33)
MCHC RBC AUTO-ENTMCNC: 32.8 G/DL (ref 31.5–35.7)
MCV RBC AUTO: 93.5 FL (ref 79–97)
MONOCYTES # BLD AUTO: 1.09 10*3/MM3 (ref 0.1–0.9)
MONOCYTES NFR BLD AUTO: 9.9 % (ref 5–12)
NEUTROPHILS NFR BLD AUTO: 7.94 10*3/MM3 (ref 1.7–7)
NEUTROPHILS NFR BLD AUTO: 72 % (ref 42.7–76)
NRBC BLD AUTO-RTO: 0 /100 WBC (ref 0–0.2)
PLATELET # BLD AUTO: 304 10*3/MM3 (ref 140–450)
PMV BLD AUTO: 10.2 FL (ref 6–12)
POTASSIUM SERPL-SCNC: 4 MMOL/L (ref 3.5–5.2)
PROT SERPL-MCNC: 6.9 G/DL (ref 6–8.5)
RBC # BLD AUTO: 4.34 10*6/MM3 (ref 3.77–5.28)
SODIUM SERPL-SCNC: 136 MMOL/L (ref 136–145)
WBC NRBC COR # BLD AUTO: 11.02 10*3/MM3 (ref 3.4–10.8)

## 2024-02-19 PROCEDURE — 99232 SBSQ HOSP IP/OBS MODERATE 35: CPT | Performed by: INTERNAL MEDICINE

## 2024-02-19 PROCEDURE — 85025 COMPLETE CBC W/AUTO DIFF WBC: CPT | Performed by: INTERNAL MEDICINE

## 2024-02-19 PROCEDURE — 80053 COMPREHEN METABOLIC PANEL: CPT | Performed by: INTERNAL MEDICINE

## 2024-02-19 RX ORDER — DILTIAZEM HYDROCHLORIDE 60 MG/1
60 TABLET, FILM COATED ORAL EVERY 8 HOURS SCHEDULED
Status: DISCONTINUED | OUTPATIENT
Start: 2024-02-19 | End: 2024-02-20 | Stop reason: HOSPADM

## 2024-02-19 RX ADMIN — Medication 10 ML: at 08:39

## 2024-02-19 RX ADMIN — METOPROLOL TARTRATE 25 MG: 25 TABLET, FILM COATED ORAL at 08:39

## 2024-02-19 RX ADMIN — DILTIAZEM HYDROCHLORIDE 60 MG: 60 TABLET, FILM COATED ORAL at 10:39

## 2024-02-19 RX ADMIN — DILTIAZEM HYDROCHLORIDE 60 MG: 60 TABLET, FILM COATED ORAL at 22:37

## 2024-02-19 RX ADMIN — LEVOTHYROXINE SODIUM 75 MCG: 0.07 TABLET ORAL at 05:35

## 2024-02-19 RX ADMIN — METOPROLOL TARTRATE 25 MG: 25 TABLET, FILM COATED ORAL at 20:24

## 2024-02-19 NOTE — PLAN OF CARE
Goal Outcome Evaluation:  Plan of Care Reviewed With: patient      Pt is resting in bed, respirations even and unlabored. No s/s of acute distress noted. No complaints verbalized at this time. Pt's HR dropped into 50s. Cardizem has been stopped for now per JOCELYN Kirkland. See mar. Call light within reach. Educated Pt on how to use it. Plan of care ongoing.          Pt had a 6 beat run of PSVT, HR got up to 135 and back down to 60s. Made JOCELYN Kirkland aware

## 2024-02-19 NOTE — PROGRESS NOTES
LOS: 2 days     Name: Moriah Davies  Age/Sex: 68 y.o. female  :  1955        PCP: Provider, No Known    Principal Problem:    SVT (supraventricular tachycardia)      Admission Information: Moriah Davies is a 68 y.o. female with hypothyroidism.    Chief Complaint: SVT    Interval history: Had been on Cardizem drip.  Her heart rate dipped into the 50s in the night and so the drip was put on hold.    Subjective     Patient is awake in bed at the time of my and Dr. Breaux's visit.  She reports that she can feel her heart racing, and it causes her increased anxiety.  She denies chest pain or shortness of air.    Vital Signs  Vital Signs (last 72 hrs)          0700   0659  0700   0659  0700   0659  0700   1532   Most Recent      Temp (°F) 98 -  98.3    98.1 -  98.6    98 -  98.6    97.6 -  98.2     97.6 (36.4)  1100    Heart Rate 58 -  146    70 -  144    69 -  110    76 -  79     76  1100    Resp 18 -  20    16 -  20      18      18     18  1100    /69 -  175/118    108/67 -  162/84    113/67 -  141/77    127/70 -  161/98     127/70  1100    SpO2 (%) 95 -  100    95 -  97    95 -  98      96     96  1100          Temp:  [97.6 °F (36.4 °C)-98.5 °F (36.9 °C)] 97.6 °F (36.4 °C)  Heart Rate:  [] 76  Resp:  [18] 18  BP: (113-161)/(67-98) 127/70  Body mass index is 31.08 kg/m².    No intake or output data in the 24 hours ending 24 1532    Vitals and nursing note reviewed.   Constitutional:       Appearance: Not in distress.   Eyes:      Conjunctiva/sclera: Conjunctivae normal.   Pulmonary:      Effort: Pulmonary effort is normal.      Breath sounds: Normal breath sounds.   Cardiovascular:      Normal rate. Regular rhythm.      Murmurs: There is no murmur.   Edema:     Peripheral edema absent.   Skin:     General: Skin is warm and dry.   Neurological:      Mental Status: Alert.   Psychiatric:         Mood and Affect: Mood is anxious.          Telemetry: Sinus 80s     Results Review:     Results from last 7 days   Lab Units 02/19/24  0215 02/18/24  0052 02/17/24  0038 02/16/24  1134 02/12/24  1637   WBC 10*3/mm3 11.02* 13.50* 8.68 9.67 7.88   HEMOGLOBIN g/dL 13.3 14.1 13.8 15.6 14.6   PLATELETS 10*3/mm3 304 331 342 396 370     Results from last 7 days   Lab Units 02/19/24  0215 02/18/24  0052 02/17/24  0038 02/16/24  1134 02/12/24  1637   SODIUM mmol/L 136 136 139 137 138   POTASSIUM mmol/L 4.0 3.9 3.6 3.8 4.2   CHLORIDE mmol/L 101 98 100 97* 97*   CO2 mmol/L 22.6 22.7 22.0 23.8 19.0*   BUN mg/dL 21 14 13 15 13   CREATININE mg/dL 1.09* 0.82 0.79 0.87 1.05*   CALCIUM mg/dL 9.2 9.4 9.3 10.3 9.7   GLUCOSE mg/dL 126* 109* 113* 122* 93     Results from last 7 days   Lab Units 02/16/24  1804 02/16/24  1134 02/12/24  1846 02/12/24  1637   HSTROP T ng/L 11 13 11 11             I reviewed the patient's new clinical results.  I reviewed the patient's new imaging results and agree with the interpretation.  I personally viewed and interpreted the patient's EKG/Telemetry data      Medication Review:   dilTIAZem, 60 mg, Oral, Q8H  enoxaparin, 40 mg, Subcutaneous, Daily  hydrOXYzine, 10 mg, Oral, Once  levothyroxine, 75 mcg, Oral, Q AM  metoprolol tartrate, 25 mg, Oral, Q12H  sodium chloride, 10 mL, Intravenous, Q12H           Assessment:  SVT      Recommendations:  Transition from IV Cardizem to oral today.  Continue metoprolol.  If heart rate remains controlled would be stable from a cardiology standpoint to discharge tomorrow-02/20/2024    Dr. Breaux discussed his recommendations with the patient.        Electronically signed by PANFILO Castanon, 02/19/24, 3:36 PM EST.

## 2024-02-19 NOTE — PLAN OF CARE
Goal Outcome Evaluation:  Pt resting in bed with no s/s of distress. VSS. IV access maintained. Plan of care ongoing.

## 2024-02-19 NOTE — PROGRESS NOTES
[facility] HOSPITALIST PROGRESS NOTE     Patient Identification:  Name:  Moriah Davies  Age:  68 y.o.  Sex:  female  :  1955  MRN:  [id number]  Visit Number:  [id number]  Primary Care Provider:  Provider, No Known    Length of stay:  2    Chief complaint: None    Subjective:    Patient seen and examined at bedside with no nursing staff present.  Patient states she is feeling well today and has no specific complaints.  She specifically denies any palpitations, chest pain, shortness of breath or any other symptoms at this time.  Note is made of recent medication change with addition of Cardizem 60 mg p.o. every 8 hours.  I have discussed patient's case with cardiology services, will continue to monitor for an additional 24 hours prior to discharge to ensure efficacy of Cardizem and Lopressor.  ----------------------------------------------------------------------------------------------------------------------  Current Hospital Meds:  dilTIAZem, 60 mg, Oral, Q8H  enoxaparin, 40 mg, Subcutaneous, Daily  hydrOXYzine, 10 mg, Oral, Once  levothyroxine, 75 mcg, Oral, Q AM  metoprolol tartrate, 25 mg, Oral, Q12H  sodium chloride, 10 mL, Intravenous, Q12H         ----------------------------------------------------------------------------------------------------------------------  Vital Signs:  Temp:  [97.6 °F (36.4 °C)-98.5 °F (36.9 °C)] 97.6 °F (36.4 °C)  Heart Rate:  [] 76  Resp:  [18] 18  BP: (113-161)/(67-98) 127/70      24  0500 24  0500 24  0500   Weight: 76.1 kg (167 lb 11.2 oz) 75.5 kg (166 lb 8 oz) 77.1 kg (169 lb 14.4 oz)     Body mass index is 31.08 kg/m².  No intake or output data in the 24 hours ending 24 1442  Diet: Regular/House Diet; Texture: Regular Texture (IDDSI 7); Fluid Consistency: Thin (IDDSI 0)  ----------------------------------------------------------------------------------------------------------------------  Physical exam:  Constitutional:  Well-nourished  female in no apparent distress.     HENT:  Head:  Normocephalic and atraumatic.  Mouth:  Moist mucous membranes.    Eyes:  Conjunctivae and EOM are normal.  Pupils are equal, round, and reactive to light.  No scleral icterus.    Neck:  Neck supple. No thyromegaly.  No JVD present.    Cardiovascular:  Regular rate and rhythm with no murmurs, rubs, clicks or gallops appreciated.  Pulmonary/Chest:  Clear to auscultation bilaterally with no crackles, wheezes or rhonchi appreciated.  Abdominal:  Soft. Nontender. Nondistended  Bowel sounds are normal in all four quadrants. No organomegally appreciated.   Musculoskeletal:  No edema, no tenderness, and no deformity.  No red or swollen joints anywhere.    Neurological:  Alert, Cranial nerves II-XII intact with no focal deficits.  No facial droop.  No slurred speech.   Skin:  Warm and dry to palpation with no rashes or lesions appreciated.  Peripheral vascular:  2+ radial and pedal pulses in bilateral upper and lower extremities.  Psychiatric:  Alert and oriented x3, demonstrates appropriate judgment and insight.  ---------------------------------------------------------------------------------------------  ----------------------------------------------------------------------------------------------------------------------  Results from last 7 days   Lab Units 02/16/24  1804 02/16/24  1134 02/12/24  1846   HSTROP T ng/L 11 13 11     Results from last 7 days   Lab Units 02/19/24  0215 02/18/24  0052 02/17/24  0038   WBC 10*3/mm3 11.02* 13.50* 8.68   HEMOGLOBIN g/dL 13.3 14.1 13.8   HEMATOCRIT % 40.6 42.2 42.0   MCV fL 93.5 91.9 91.9   MCHC g/dL 32.8 33.4 32.9   PLATELETS 10*3/mm3 304 331 342         Results from last 7 days   Lab Units 02/19/24  0215 02/18/24  0052 02/17/24  0038 02/16/24  1134 02/12/24  1637   SODIUM mmol/L 136 136 139 137 138   POTASSIUM mmol/L 4.0 3.9 3.6 3.8 4.2   MAGNESIUM mg/dL  --  1.9 1.9 2.1  --    CHLORIDE mmol/L 101 98 100  "97* 97*   CO2 mmol/L 22.6 22.7 22.0 23.8 19.0*   BUN mg/dL 21 14 13 15 13   CREATININE mg/dL 1.09* 0.82 0.79 0.87 1.05*   CALCIUM mg/dL 9.2 9.4 9.3 10.3 9.7   GLUCOSE mg/dL 126* 109* 113* 122* 93   ALBUMIN g/dL 3.7  --  4.0  --  4.6   BILIRUBIN mg/dL 0.5  --  0.4  --  0.7   ALK PHOS U/L 59  --  62  --  78   AST (SGOT) U/L 16  --  19  --  27   ALT (SGPT) U/L 12  --  16  --  20   Estimated Creatinine Clearance: 46.2 mL/min (A) (by C-G formula based on SCr of 1.09 mg/dL (H)).    No results found for: \"AMMONIA\"      No results found for: \"BLOODCX\"  No results found for: \"URINECX\"  No results found for: \"WOUNDCX\"  No results found for: \"STOOLCX\"    I have personally looked at the labs and they are summarized above.  ----------------------------------------------------------------------------------------------------------------------  Imaging Results (Last 24 Hours)       ** No results found for the last 24 hours. **          ----------------------------------------------------------------------------------------------------------------------  Assessment and Plan:    Recurrent SVT -no further episodes of SVT overnight.  Have added oral Cardizem per cardiology recommendations.  Will continue Cardizem 60 mg p.o. every 8 hours as well as Lopressor 25 mg p.o. twice daily.  If no further arrhythmias are noted, will plan for discharge early tomorrow morning.  Will also make outpatient follow-up with EP services.    2.  Hypothyroidism -continue reduced dose of Synthroid at 75 mcg p.o. daily.    Disposition probable discharge home tomorrow morning    Demond Collins, DO   02/19/24   14:42 EST     "

## 2024-02-20 ENCOUNTER — READMISSION MANAGEMENT (OUTPATIENT)
Dept: CALL CENTER | Facility: HOSPITAL | Age: 69
End: 2024-02-20
Payer: COMMERCIAL

## 2024-02-20 VITALS
RESPIRATION RATE: 18 BRPM | HEART RATE: 71 BPM | TEMPERATURE: 98 F | BODY MASS INDEX: 31.81 KG/M2 | OXYGEN SATURATION: 98 % | SYSTOLIC BLOOD PRESSURE: 154 MMHG | WEIGHT: 172.84 LBS | HEIGHT: 62 IN | DIASTOLIC BLOOD PRESSURE: 90 MMHG

## 2024-02-20 PROBLEM — I47.10 SVT (SUPRAVENTRICULAR TACHYCARDIA): Status: RESOLVED | Noted: 2024-02-16 | Resolved: 2024-02-20

## 2024-02-20 LAB
ANION GAP SERPL CALCULATED.3IONS-SCNC: 11.2 MMOL/L (ref 5–15)
BUN SERPL-MCNC: 20 MG/DL (ref 8–23)
BUN/CREAT SERPL: 22.2 (ref 7–25)
CALCIUM SPEC-SCNC: 9.7 MG/DL (ref 8.6–10.5)
CHLORIDE SERPL-SCNC: 101 MMOL/L (ref 98–107)
CO2 SERPL-SCNC: 23.8 MMOL/L (ref 22–29)
CREAT SERPL-MCNC: 0.9 MG/DL (ref 0.57–1)
DEPRECATED RDW RBC AUTO: 50.5 FL (ref 37–54)
EGFRCR SERPLBLD CKD-EPI 2021: 68.9 ML/MIN/1.73
ERYTHROCYTE [DISTWIDTH] IN BLOOD BY AUTOMATED COUNT: 14.6 % (ref 12.3–15.4)
GLUCOSE SERPL-MCNC: 138 MG/DL (ref 65–99)
HCT VFR BLD AUTO: 43.4 % (ref 34–46.6)
HGB BLD-MCNC: 14.1 G/DL (ref 12–15.9)
MCH RBC QN AUTO: 30.5 PG (ref 26.6–33)
MCHC RBC AUTO-ENTMCNC: 32.5 G/DL (ref 31.5–35.7)
MCV RBC AUTO: 93.9 FL (ref 79–97)
PLATELET # BLD AUTO: 320 10*3/MM3 (ref 140–450)
PMV BLD AUTO: 10.4 FL (ref 6–12)
POTASSIUM SERPL-SCNC: 4.2 MMOL/L (ref 3.5–5.2)
RBC # BLD AUTO: 4.62 10*6/MM3 (ref 3.77–5.28)
SODIUM SERPL-SCNC: 136 MMOL/L (ref 136–145)
WBC NRBC COR # BLD AUTO: 10.1 10*3/MM3 (ref 3.4–10.8)

## 2024-02-20 PROCEDURE — 80048 BASIC METABOLIC PNL TOTAL CA: CPT | Performed by: INTERNAL MEDICINE

## 2024-02-20 PROCEDURE — 99239 HOSP IP/OBS DSCHRG MGMT >30: CPT | Performed by: INTERNAL MEDICINE

## 2024-02-20 PROCEDURE — 85027 COMPLETE CBC AUTOMATED: CPT | Performed by: INTERNAL MEDICINE

## 2024-02-20 RX ORDER — LEVOTHYROXINE SODIUM 0.07 MG/1
75 TABLET ORAL
Qty: 30 TABLET | Refills: 1 | Status: SHIPPED | OUTPATIENT
Start: 2024-02-21

## 2024-02-20 RX ORDER — DILTIAZEM HYDROCHLORIDE 60 MG/1
60 TABLET, FILM COATED ORAL EVERY 8 HOURS SCHEDULED
Qty: 90 TABLET | Refills: 1 | Status: SHIPPED | OUTPATIENT
Start: 2024-02-20 | End: 2024-02-27 | Stop reason: SDUPTHER

## 2024-02-20 RX ADMIN — DILTIAZEM HYDROCHLORIDE 60 MG: 60 TABLET, FILM COATED ORAL at 06:16

## 2024-02-20 RX ADMIN — LEVOTHYROXINE SODIUM 75 MCG: 0.07 TABLET ORAL at 06:16

## 2024-02-20 RX ADMIN — METOPROLOL TARTRATE 25 MG: 25 TABLET, FILM COATED ORAL at 08:31

## 2024-02-20 NOTE — DISCHARGE SUMMARY
[facility] HOSPITALIST MEDICINE DISCHARGE SUMMARY    Patient Identification:  Name:  Moriah Davies  Age:  68 y.o.  Sex:  female  :  1955  MRN:  [id number]  Visit Number:  [id number]    Date of Admission: 2024  Date of Discharge: 2024    PCP: Provider, No Known    DISCHARGE DIAGNOSIS   SVT  Hypothyroidism      CONSULTS  Clarisse Robins NP, Caridoly      PROCEDURES PERFORMED   None      HOSPITAL COURSE  Ms. Davies is a 68 y.o. female who presented to [facility] ED on 2024 with a chief complaint of rapid heart rate.  Patient has a past medical history remarkable for hypothyroidism.  Patient reports having elevated heart rate in the 120s which started approximately 3 weeks prior to evaluation in the emergency department.  She reported multiple episodes of tachycardia which have increased in frequency and intensity the few days prior to evaluation in the emergency department.  Secondary to palpitations, patient did present to the emergency department for further treatment and evaluation.  Initial evaluation in the emergency department did consist of basic laboratory work as well as physical exam and vital signs.  Please see initial H&P for specific details.  EKG was obtained which found patient to be in SVT for which patient received IV Cardizem 10 mg x 1 dose followed by 1 L LR bolus and Lopressor 5 mg IV x 2 doses.  Patient was then placed on a Cardizem drip in the emergency department.  Patient was then admitted for further treatment and evaluation.    Cardiology was consulted who did thoroughly evaluate the patient.  After thorough evaluation recommendations were made to give patient IV Brogan which did convert patient to normal sinus rhythm.  However, patient did have recurrent SVT later that day.  Recommendation was made to restart patient on IV Cardizem which was eventually transitioned to oral Cardizem the day after.  Patient was also started on oral metoprolol  25 mg p.o. twice daily.  IV Cardizem was transition to oral Cardizem on 2/19/2024.  Patient was monitored for another 24 hours and patient demonstrated continued rate and rhythm control with both metoprolol and Cardizem.  Patient has been recommended to follow-up with EP services on discharge.  With this in mind, it is felt patient has reached maximal medical benefit of current hospitalization and will be discharged home in stable condition today.  The beforementioned plan was thoroughly discussed with the patient and she expressed her understanding and willingness to proceed with the beforementioned plan.    VITAL SIGNS:      02/18/24  0500 02/19/24  0500 02/20/24  0530   Weight: 75.5 kg (166 lb 8 oz) 77.1 kg (169 lb 14.4 oz) 78.4 kg (172 lb 13.5 oz)     Body mass index is 31.61 kg/m².    PHYSICAL EXAM:  Constitutional: Well-nourished  female in no apparent distress.     HENT:  Head:  Normocephalic and atraumatic.  Mouth:  Moist mucous membranes.    Eyes:  Conjunctivae and EOM are normal.  Pupils are equal, round, and reactive to light.  No scleral icterus.    Neck:  Neck supple. No thyromegaly.  No JVD present.    Cardiovascular:  Regular rate and rhythm with no murmurs, rubs, clicks or gallops appreciated.  Pulmonary/Chest:  Clear to auscultation bilaterally with no crackles, wheezes or rhonchi appreciated.  Abdominal:  Soft. Nontender. Nondistended  Bowel sounds are normal in all four quadrants. No organomegally appreciated.   Musculoskeletal:  No edema, no tenderness, and no deformity.  No red or swollen joints anywhere.    Neurological:  Alert, Cranial nerves II-XII intact with no focal deficits.  No facial droop.  No slurred speech.   Skin:  Warm and dry to palpation with no rashes or lesions appreciated.  Peripheral vascular:  2+ radial and pedal pulses in bilateral upper and lower extremities.  Psychiatric:  Alert and oriented x3, demonstrates appropriate judgment and insight.    DISCHARGE  DISPOSITION   Stable    DISCHARGE MEDICATIONS:     Discharge Medications        New Medications        Instructions Start Date   dilTIAZem 60 MG tablet  Commonly known as: CARDIZEM   60 mg, Oral, Every 8 Hours Scheduled      metoprolol tartrate 25 MG tablet  Commonly known as: LOPRESSOR   25 mg, Oral, Every 12 Hours Scheduled             Changes to Medications        Instructions Start Date   levothyroxine 75 MCG tablet  Commonly known as: SYNTHROID, LEVOTHROID  What changed:   medication strength  how much to take  when to take this   75 mcg, Oral, Every Early Morning   Start Date: February 21, 2024              Diet Instructions    Resume diet as tolerated          Your Scheduled Appointments      Feb 27, 2024  1:30 PM  Hospital Follow Up with PANFILO Castanon  [facility] CARDIOLOGY ([place]) [address] [place] [id number] [phone]   -Bring photo ID, insurance card, and list of medications to appointment  -If testing was completed outside of [facility] then patient must bring images on a disc  -Copay will be collected at time of appointment  -Established patients should arrive at time of appointment       Additional instructions:    Pt has  an  appointment  with  her  primary  for  dr quintero  for  march 1 at  11 am  and  esthela soto  for  February 27  at  1:30         Activity Instructions    Resume activity as tolerated          Additional Instructions for the Follow-ups that You Need to Schedule       Discharge Follow-up with Specified Provider: Dr. Breaux; 1 Week   As directed      To: Dr. Breaux   Follow Up: 1 Week   Follow Up Details: SVT               Follow-up Information       Provider, No Known .    Contact information:  [facility] [address]  [phone]                             TEST  RESULTS PENDING AT DISCHARGE       Demond Collins DO  02/20/24  13:24 EST    Please note that this discharge summary required more than 30 minutes to  complete.    Please send a copy of this dictation to the following providers:  Provider, No Known

## 2024-02-20 NOTE — PAYOR COMM NOTE
"CONTACT:  MEGAN SILVA RN  UTILIZATION MANAGEMENT DEPT.   [facility]   [address] [facility], [address]   PHONE:  [phone]   FAX: [phone]       DC NOTIFICATION 24 HOME    REF# [id number].            Moriah Davies (68 y.o. Female)       Date of Birth   1955    Social Security Number       Address   [address] [place] [address] [address] [facility] [address]    Home Phone   [phone]    MRN   [id number]       Jain   Other    Marital Status                               Admission Date   24    Admission Type   Emergency    Admitting Provider   Forest Ulrich DO    Attending Provider       Department, Room/Bed   [address] 3321/       Discharge Date   2024    Discharge Disposition   Home or Self Care    Discharge Destination   Home                              Attending Provider: (none)   Allergies: No Known Allergies    Isolation: None   Infection: None   Code Status: Prior    Ht: 157.5 cm (62\")   Wt: 78.4 kg (172 lb 13.5 oz)    Admission Cmt: None   Principal Problem: SVT (supraventricular tachycardia) [I47.10]                   Active Insurance as of 2024       Primary Coverage       Payor Plan Insurance Group Employer/Plan Group    [facility] [employer] [facility] [id number]       Payor Plan Address Payor Plan Phone Number Payor Plan Fax Number Effective Dates    [address] [phone]  2017 - None Entered    [place] [address]         Subscriber Name Subscriber Birth Date Member ID       MATTIE REYES  [id number]                     Emergency Contacts        (Rel.) Home Phone Work Phone Mobile Phone    MATTIE REYES (Spouse) [phone] -- --                 Discharge Summary        Demond Collins DO at 24 1121              [facility] HOSPITALIST MEDICINE DISCHARGE SUMMARY    Patient Identification:  Name:  Moriah Davies  Age:  68 y.o.  Sex:  female  :  " 1955  MRN:  [id number]  Visit Number:  [id number]    Date of Admission: 2/16/2024  Date of Discharge: 2/20/2024    PCP: Provider, No Known    DISCHARGE DIAGNOSIS   SVT  Hypothyroidism      CONSULTS  Clarisse Robins NP, [facility]      PROCEDURES PERFORMED   None      HOSPITAL COURSE  Ms. Davies is a 68 y.o. female who presented to [facility] ED on 2/16/2024 with a chief complaint of rapid heart rate.  Patient has a past medical history remarkable for hypothyroidism.  Patient reports having elevated heart rate in the 120s which started approximately 3 weeks prior to evaluation in the emergency department.  She reported multiple episodes of tachycardia which have increased in frequency and intensity the few days prior to evaluation in the emergency department.  Secondary to palpitations, patient did present to the emergency department for further treatment and evaluation.  Initial evaluation in the emergency department did consist of basic laboratory work as well as physical exam and vital signs.  Please see initial H&P for specific details.  EKG was obtained which found patient to be in SVT for which patient received IV Cardizem 10 mg x 1 dose followed by 1 L LR bolus and Lopressor 5 mg IV x 2 doses.  Patient was then placed on a Cardizem drip in the emergency department.  Patient was then admitted for further treatment and evaluation.    Cardiology was consulted who did thoroughly evaluate the patient.  After thorough evaluation recommendations were made to give patient IV Eden which did convert patient to normal sinus rhythm.  However, patient did have recurrent SVT later that day.  Recommendation was made to restart patient on IV Cardizem which was eventually transitioned to oral Cardizem the day after.  Patient was also started on oral metoprolol 25 mg p.o. twice daily.  IV Cardizem was transition to oral Cardizem on 2/19/2024.  Patient was monitored for another 24 hours and patient demonstrated  continued rate and rhythm control with both metoprolol and Cardizem.  Patient has been recommended to follow-up with EP services on discharge.  With this in mind, it is felt patient has reached maximal medical benefit of current hospitalization and will be discharged home in stable condition today.  The beforementioned plan was thoroughly discussed with the patient and she expressed her understanding and willingness to proceed with the beforementioned plan.    VITAL SIGNS:      02/18/24  0500 02/19/24  0500 02/20/24  0530   Weight: 75.5 kg (166 lb 8 oz) 77.1 kg (169 lb 14.4 oz) 78.4 kg (172 lb 13.5 oz)     Body mass index is 31.61 kg/m².    PHYSICAL EXAM:  Constitutional: Well-nourished  female in no apparent distress.     HENT:  Head:  Normocephalic and atraumatic.  Mouth:  Moist mucous membranes.    Eyes:  Conjunctivae and EOM are normal.  Pupils are equal, round, and reactive to light.  No scleral icterus.    Neck:  Neck supple. No thyromegaly.  No JVD present.    Cardiovascular:  Regular rate and rhythm with no murmurs, rubs, clicks or gallops appreciated.  Pulmonary/Chest:  Clear to auscultation bilaterally with no crackles, wheezes or rhonchi appreciated.  Abdominal:  Soft. Nontender. Nondistended  Bowel sounds are normal in all four quadrants. No organomegally appreciated.   Musculoskeletal:  No edema, no tenderness, and no deformity.  No red or swollen joints anywhere.    Neurological:  Alert, Cranial nerves II-XII intact with no focal deficits.  No facial droop.  No slurred speech.   Skin:  Warm and dry to palpation with no rashes or lesions appreciated.  Peripheral vascular:  2+ radial and pedal pulses in bilateral upper and lower extremities.  Psychiatric:  Alert and oriented x3, demonstrates appropriate judgment and insight.    DISCHARGE DISPOSITION   Stable    DISCHARGE MEDICATIONS:     Discharge Medications        New Medications        Instructions Start Date   dilTIAZem 60 MG  tablet  Commonly known as: CARDIZEM   60 mg, Oral, Every 8 Hours Scheduled      metoprolol tartrate 25 MG tablet  Commonly known as: LOPRESSOR   25 mg, Oral, Every 12 Hours Scheduled             Changes to Medications        Instructions Start Date   levothyroxine 75 MCG tablet  Commonly known as: SYNTHROID, LEVOTHROID  What changed:   medication strength  how much to take  when to take this   75 mcg, Oral, Every Early Morning   Start Date: February 21, 2024              Diet Instructions    Resume diet as tolerated          Your Scheduled Appointments      Feb 27, 2024  1:30 PM  Hospital Follow Up with PANFILO Castanon  [facility] CARDIOLOGY ([place]) [address] [place] [id number] [phone]   -Bring photo ID, insurance card, and list of medications to appointment  -If testing was completed outside of [facility] then patient must bring images on a disc  -Copay will be collected at time of appointment  -Established patients should arrive at time of appointment       Additional instructions:    Pt has  an  appointment  with  her  primary  for  dr quintero  for  march 1 at  11 am  and  esthela soto  for  February 27  at  1:30         Activity Instructions    Resume activity as tolerated          Additional Instructions for the Follow-ups that You Need to Schedule       Discharge Follow-up with Specified Provider: Dr. Breaux; 1 Week   As directed      To: Dr. Breaux   Follow Up: 1 Week   Follow Up Details: SVT               Follow-up Information       Provider, No Known .    Contact information:  [facility] [address]  [phone]                             TEST  RESULTS PENDING AT DISCHARGE       Demond Collins DO  02/20/24  13:24 EST    Please note that this discharge summary required more than 30 minutes to complete.    Please send a copy of this dictation to the following providers:  Provider, No Known    Electronically signed by Demond Collins  DO Shorty at 02/20/24 1342       Discharge Order (From admission, onward)       Start     Ordered    02/20/24 0735  Discharge patient  Once        Expected Discharge Date: 02/20/24   Expected Discharge Time: Morning   Discharge Disposition: Home or Self Care   Physician of Record for Attribution - Please select from Treatment Team: PARISH DAVIDSON [[id number]]   Review needed by CMO to determine Physician of Record: No      Question Answer Comment   Physician of Record for Attribution - Please select from Treatment Team PARISH DAVIDSON    Review needed by CMO to determine Physician of Record No        02/20/24 0746

## 2024-02-20 NOTE — DISCHARGE INSTR - APPOINTMENTS
Pt has  an  appointment  with  her  primary  for  dr quintero  for  march 1 at  11 am  and  esthela soto  for  February 27  at  1:30

## 2024-02-20 NOTE — PROGRESS NOTES
"   LOS: 3 days     Name: Moriah Davies  Age/Sex: 68 y.o. female  :  1955        PCP: Provider, No Known    Active Problems:    * No active hospital problems. *      Admission Information: Moriah Davies is a 68 y.o. female with hypothyroidism    Chief Complaint: SVT    Interval history: Heart rate ranged between 68 and 79 yesterday.    Subjective   Patient is awake in bed, and anxious to go home.  She is extremely concerned about requiring medication to keep her heart rate down.  \"I have never done drugs, and I want to do everything possible to not have to take them.\"  I discussed the options including a) not treating rapid heart rate which could ultimately cause cardiomyopathy b) using medications to control heart rate c) consulting electrophysiology to see if she is a candidate for an ablation.    She very much wishes to pursue procedural therapy-after researching it on her own-in order to avoid requiring medication.      Vital Signs  Vital Signs (last 72 hrs)          0700   07 07 07 1104   Most Recent      Temp (°F) 98.1 -  98.6    98 -  98.6    97.6 -  98.3       98 (36.7) 627    Heart Rate 70 -  144    69 -  110    68 -  79       71 627    Resp 16 -        18      18       18 627    /67 -  162/84    113/67 -  141/77    106/64 -  161/98       154/90 627    SpO2 (%) 95 -  97    95 -  98    95 -  98       98 627          Temp:  [97.9 °F (36.6 °C)-98.3 °F (36.8 °C)] 98 °F (36.7 °C)  Heart Rate:  [68-76] 71  Resp:  [18] 18  BP: (106-154)/(64-90) 154/90  Body mass index is 31.61 kg/m².      Intake/Output Summary (Last 24 hours) at 2024 1104  Last data filed at 2024 1700  Gross per 24 hour   Intake 480 ml   Output --   Net 480 ml       Constitutional:       Appearance: Not in distress.   Eyes:      Conjunctiva/sclera: Conjunctivae normal.   Pulmonary:      Effort: Pulmonary effort is " normal.      Breath sounds: Normal breath sounds.   Cardiovascular:      Normal rate. Regular rhythm.      Murmurs: There is no murmur.   Edema:     Peripheral edema absent.   Skin:     General: Skin is warm and dry.   Neurological:      Mental Status: Alert.   Psychiatric:         Mood and Affect: Mood is anxious.         Telemetry: Sinus 70s     Results Review:     Results from last 7 days   Lab Units 02/20/24  0116 02/19/24  0215 02/18/24  0052 02/17/24  0038 02/16/24  1134   WBC 10*3/mm3 10.10 11.02* 13.50* 8.68 9.67   HEMOGLOBIN g/dL 14.1 13.3 14.1 13.8 15.6   PLATELETS 10*3/mm3 320 304 331 342 396     Results from last 7 days   Lab Units 02/20/24  0116 02/19/24  0215 02/18/24  0052 02/17/24  0038 02/16/24  1134   SODIUM mmol/L 136 136 136 139 137   POTASSIUM mmol/L 4.2 4.0 3.9 3.6 3.8   CHLORIDE mmol/L 101 101 98 100 97*   CO2 mmol/L 23.8 22.6 22.7 22.0 23.8   BUN mg/dL 20 21 14 13 15   CREATININE mg/dL 0.90 1.09* 0.82 0.79 0.87   CALCIUM mg/dL 9.7 9.2 9.4 9.3 10.3   GLUCOSE mg/dL 138* 126* 109* 113* 122*     Results from last 7 days   Lab Units 02/16/24  1804 02/16/24  1134   HSTROP T ng/L 11 13             I reviewed the patient's new clinical results.  I reviewed the patient's new imaging results and agree with the interpretation.  I personally viewed and interpreted the patient's EKG/Telemetry data      Medication Review:   dilTIAZem, 60 mg, Oral, Q8H  enoxaparin, 40 mg, Subcutaneous, Daily  hydrOXYzine, 10 mg, Oral, Once  levothyroxine, 75 mcg, Oral, Q AM  metoprolol tartrate, 25 mg, Oral, Q12H  sodium chloride, 10 mL, Intravenous, Q12H           Assessment:  SVT      Recommendations:  Doing well with oral Cardizem and combination with metoprolol.  Stable from a cardiology standpoint for discharge.    I did ask her to come by our office after she is officially discharged from the hospital so that an event monitor can be placed to determine her SVT burden.  Follow-up in clinic in 1 to 2 weeks.    I  discussed the patients findings and my recommendations with patient.      Electronically signed by PANFILO Castanon, 02/20/24, 11:12 AM EST.

## 2024-02-21 ENCOUNTER — READMISSION MANAGEMENT (OUTPATIENT)
Dept: CALL CENTER | Facility: HOSPITAL | Age: 69
End: 2024-02-21
Payer: COMMERCIAL

## 2024-02-21 NOTE — OUTREACH NOTE
Medical Week 1 Survey      Flowsheet Row Responses   Jain facility patient discharged from? [facility]   Does the patient have one of the following disease processes/diagnoses(primary or secondary)? Other   Week 1 attempt successful? No   Unsuccessful attempts Attempt 1            Lashonda WHITE - Licensed Nurse

## 2024-02-21 NOTE — OUTREACH NOTE
Prep Survey      Flowsheet Row Responses   Mormon facility patient discharged from? [facility]   Is LACE score < 7 ? No   Eligibility Readm Mgmt   Discharge diagnosis SVT   Does the patient have one of the following disease processes/diagnoses(primary or secondary)? Other   Does the patient have Home health ordered? No   Is there a DME ordered? No   Prep survey completed? Yes            SEAN HODGES - Registered Nurse

## 2024-02-22 NOTE — PAYOR COMM NOTE
"Moriah Davies (68 y.o. Female)       Date of Birth   1955    Social Security Number       Address   [address] [place] [address] [address] [place] [address]    Home Phone   [phone]    MRN   [id number]       Alevism   Other    Marital Status                               Admission Date   2/16/24    Admission Type   Emergency    Admitting Provider   Forest Ulrich DO    Attending Provider       Department, Room/Bed   [address], 3321/       Discharge Date   2/20/2024    Discharge Disposition   Home or Self Care    Discharge Destination   Home                              Attending Provider: (none)   Allergies: No Known Allergies    Isolation: None   Infection: None   Code Status: Prior    Ht: 157.5 cm (62\")   Wt: 78.4 kg (172 lb 13.5 oz)    Admission Cmt: None   Principal Problem: SVT (supraventricular tachycardia) [I47.10]                   Active Insurance as of 2/16/2024       Primary Coverage       Payor Plan Insurance Group Employer/Plan Group    ANTH [employer] [facility] BLUE CROSS BLUE [employer]IELD O [id number]       Payor Plan Address Payor Plan Phone Number Payor Plan Fax Number Effective Dates    [address] [phone]  1/1/2017 - None Entered    [facility] [id number]         Subscriber Name Subscriber Birth Date Member ID       MATTIE REYES  [id number]                     Emergency Contacts        (Rel.) Home Phone Work Phone Mobile Phone    AMYMATTIE (Spouse) [phone] -- --              {Documentation Categories:[id number]  "

## 2024-02-23 ENCOUNTER — READMISSION MANAGEMENT (OUTPATIENT)
Dept: CALL CENTER | Facility: HOSPITAL | Age: 69
End: 2024-02-23
Payer: COMMERCIAL

## 2024-02-23 NOTE — OUTREACH NOTE
Medical Week 1 Survey      Flowsheet Row Responses   [facility] patient discharged from? [facility]   Does the patient have one of the following disease processes/diagnoses(primary or secondary)? Other   Week 1 attempt successful? Yes   Call start time 1034   Call end time 1037   Discharge diagnosis SVT   Meds reviewed with patient/caregiver? Yes   Is the patient having any side effects they believe may be caused by any medication additions or changes? No   Does the patient have all medications ordered at discharge? Yes   Is the patient taking all medications as directed (includes completed medication regime)? Yes   Does the patient have a primary care provider?  Yes   Does the patient have an appointment with their PCP within 7 days of discharge? No   Comments regarding PCP Patient states she has a pcp, but did not state the name.   Has the patient kept scheduled appointments due by today? N/A   Has home health visited the patient within 72 hours of discharge? N/A   Psychosocial issues? No   Did the patient receive a copy of their discharge instructions? Yes   Nursing interventions Reviewed instructions with patient   What is the patient's perception of their health status since discharge? Improving   Is the patient/caregiver able to teach back signs and symptoms related to disease process for when to call PCP? Yes   Is the patient/caregiver able to teach back signs and symptoms related to disease process for when to call 911? Yes   Is the patient/caregiver able to teach back the hierarchy of who to call/visit for symptoms/problems? PCP, Specialist, Home health nurse, Urgent Care, ED, 911 Yes   If the patient is a current smoker, are they able to teach back resources for cessation? Not a smoker   Week 1 call completed? Yes   Call end time 1037            Germaine ZEPEDA - Licensed Nurse

## 2024-02-27 ENCOUNTER — OFFICE VISIT (OUTPATIENT)
Dept: CARDIOLOGY | Facility: CLINIC | Age: 69
End: 2024-02-27
Payer: COMMERCIAL

## 2024-02-27 VITALS
BODY MASS INDEX: 29.44 KG/M2 | SYSTOLIC BLOOD PRESSURE: 137 MMHG | OXYGEN SATURATION: 98 % | HEIGHT: 62 IN | WEIGHT: 160 LBS | DIASTOLIC BLOOD PRESSURE: 79 MMHG | HEART RATE: 66 BPM

## 2024-02-27 DIAGNOSIS — I47.10 PAROXYSMAL SVT (SUPRAVENTRICULAR TACHYCARDIA): Primary | ICD-10-CM

## 2024-02-27 PROBLEM — E03.9 HYPOTHYROIDISM (ACQUIRED): Chronic | Status: ACTIVE | Noted: 2024-02-27

## 2024-02-27 PROBLEM — E03.9 HYPOTHYROIDISM (ACQUIRED): Status: ACTIVE | Noted: 2024-02-27

## 2024-02-27 PROCEDURE — 99214 OFFICE O/P EST MOD 30 MIN: CPT | Performed by: NURSE PRACTITIONER

## 2024-02-27 RX ORDER — DILTIAZEM HYDROCHLORIDE 60 MG/1
60 TABLET, FILM COATED ORAL 2 TIMES DAILY
Qty: 180 TABLET | Refills: 1 | Status: SHIPPED | OUTPATIENT
Start: 2024-02-27

## 2024-02-27 NOTE — PROGRESS NOTES
"Chief Complaint  Shortness of Breath (Tachycardia, shortness of breath , has felt palpitations )    Subjective          Moriah Davies presents to [facility] CARDIOLOGY for follow up.    History of Present Illness  Ms. Davies presents for follow-up of having been hospitalized 02/16/2024 through 02/20/2024 due to SVT.  Her heart rate had been extremely difficult to control while hospitalized.  She was initiated on beta-blocker and calcium channel blocker therapy.  Upon discharge she came to our office and an event monitor was placed.    Bending forward seems to make her heart beat faster.      She continues to be extremely averse to taking medications.  She has reduced her diltiazem dose to twice daily as opposed to every 8 hours.  She continues to wear the event monitor.  No serious or critical events received to date.  Tobacco Use: Low Risk  (2/27/2024)    Patient History     Smoking Tobacco Use: Never     Smokeless Tobacco Use: Never     Passive Exposure: Not on file       Objective     Vital Signs:   /79 (BP Location: Left arm, Patient Position: Sitting, Cuff Size: Adult)   Pulse 66   Ht 157.5 cm (62\")   Wt 72.6 kg (160 lb)   SpO2 98%   BMI 29.26 kg/m²       Physical Exam  Vitals and nursing note reviewed. Exam conducted with a chaperone present (Wife accompanies).   Constitutional:       General: She is not in acute distress.     Appearance: She is normal weight.   HENT:      Head: Normocephalic and atraumatic.   Neck:      Vascular: No carotid bruit.   Cardiovascular:      Rate and Rhythm: Normal rate and regular rhythm.      Pulses:           Posterior tibial pulses are 2+ on the right side and 2+ on the left side.      Heart sounds: No murmur heard.     No friction rub. No gallop.   Pulmonary:      Effort: Pulmonary effort is normal.      Breath sounds: Normal breath sounds.   Musculoskeletal:      Right lower leg: No edema.      Left lower leg: No edema.   Skin:     General: " Skin is warm and dry.   Neurological:      General: No focal deficit present.      Mental Status: She is alert.   Psychiatric:         Mood and Affect: Mood normal.         Behavior: Behavior normal.          Result Review :   The following data was reviewed by: PANFILO Castanon on 02/27/2024:  Common labs          2/18/2024    00:52 2/19/2024    02:15 2/20/2024    01:16   Common Labs   Glucose 109  126  138    BUN 14  21  20    Creatinine 0.82  1.09  0.90    Sodium 136  136  136    Potassium 3.9  4.0  4.2    Chloride 98  101  101    Calcium 9.4  9.2  9.7    Albumin  3.7     Total Bilirubin  0.5     Alkaline Phosphatase  59     AST (SGOT)  16     ALT (SGPT)  12     WBC 13.50  11.02  10.10    Hemoglobin 14.1  13.3  14.1    Hematocrit 42.2  40.6  43.4    Platelets 331  304  320      Data reviewed : Cardiology studies as detailed below      Last Cardiac Cath      Last Stress test       Last Echo  Results for orders placed during the hospital encounter of 02/16/24    Adult Transthoracic Echo Complete W/ Cont if Necessary Per Protocol    Interpretation Summary    Left ventricular systolic function is normal. Left ventricular ejection fraction appears to be 56 - 60%.    Left ventricular wall thickness is consistent with mild concentric hypertrophy.    Estimated right ventricular systolic pressure from tricuspid regurgitation is mildly elevated (35-45 mmHg).    Mild pulmonary hypertension is present.    There is no evidence of pericardial effusion.             Current Outpatient Medications   Medication Sig Dispense Refill    dilTIAZem (CARDIZEM) 60 MG tablet Take 1 tablet by mouth 2 (Two) Times a Day. 180 tablet 1    levothyroxine (SYNTHROID, LEVOTHROID) 75 MCG tablet Take 1 tablet by mouth Every Morning. 30 tablet 1    metoprolol tartrate (LOPRESSOR) 25 MG tablet Take 1 tablet by mouth Every 12 (Twelve) Hours. 180 tablet 1     No current facility-administered medications for this visit.            Assessment and Plan     Problem List Items Addressed This Visit       Paroxysmal SVT (supraventricular tachycardia) - Primary    Relevant Medications    dilTIAZem (CARDIZEM) 60 MG tablet    metoprolol tartrate (LOPRESSOR) 25 MG tablet    Other Relevant Orders    Ambulatory Referral to Cardiac Electrophysiology     Diagnoses and all orders for this visit:    1. Paroxysmal SVT (supraventricular tachycardia) (Primary)  -     dilTIAZem (CARDIZEM) 60 MG tablet; Take 1 tablet by mouth 2 (Two) Times a Day.  Dispense: 180 tablet; Refill: 1  -     metoprolol tartrate (LOPRESSOR) 25 MG tablet; Take 1 tablet by mouth Every 12 (Twelve) Hours.  Dispense: 180 tablet; Refill: 1  -     Ambulatory Referral to Cardiac Electrophysiology            Follow Up     Return in about 3 months (around 5/27/2024).    Patient was given instructions and counseling regarding her condition or for health maintenance advice. Please see specific information pulled into the AVS if appropriate.       Electronically signed by PANFILO Castanon, 02/27/24, 2:15 PM EST.      Dictated Utilizing Dragon Dictation: Part of this note may be an electronic transcription/translation of spoken language to printed text using the Dragon Dictation System

## 2024-03-01 ENCOUNTER — READMISSION MANAGEMENT (OUTPATIENT)
Dept: CALL CENTER | Facility: HOSPITAL | Age: 69
End: 2024-03-01
Payer: COMMERCIAL

## 2024-03-01 NOTE — OUTREACH NOTE
Medical Week 2 Survey      Flowsheet Row Responses   [facility] patient discharged from? [facility]   Does the patient have one of the following disease processes/diagnoses(primary or secondary)? Other   Week 2 attempt successful? Yes   Call start time 1606   Discharge diagnosis SVT   Call end time 1608   Is the patient taking all medications as directed (includes completed medication regime)? Yes   Comments regarding appointments seen cardiology on 2/27   Does the patient have a primary care provider?  Yes   Has the patient kept scheduled appointments due by today? Yes   Has home health visited the patient within 72 hours of discharge? N/A   Psychosocial issues? No   What is the patient's perception of their health status since discharge? Improving   Is the patient/caregiver able to teach back signs and symptoms related to disease process for when to call PCP? Yes   Is the patient/caregiver able to teach back signs and symptoms related to disease process for when to call 911? Yes   Is the patient/caregiver able to teach back the hierarchy of who to call/visit for symptoms/problems? PCP, Specialist, Home health nurse, Urgent Care, ED, 911 Yes   Week 2 Call Completed? Yes   Graduated Yes   Did the patient feel the follow up calls were helpful during their recovery period? Yes   Was the number of calls appropriate? Yes   Is the patient interested in additional calls from an ambulatory ? No   Would this patient benefit from a Referral to Amb Social Work? No   Graduated/Revoked comments Doing well, all concerns addressed, she has been in close contact with cardiology, no further calls needed.   Call end time 1608            Natividad VIDAL - Registered Nurse  
Yes-Patient/Caregiver accepts free interpretation services...

## 2024-03-05 ENCOUNTER — TELEPHONE (OUTPATIENT)
Dept: CARDIOLOGY | Facility: CLINIC | Age: 69
End: 2024-03-05

## 2024-03-19 ENCOUNTER — OFFICE VISIT (OUTPATIENT)
Dept: CARDIOLOGY | Facility: CLINIC | Age: 69
End: 2024-03-19
Payer: COMMERCIAL

## 2024-03-19 VITALS
DIASTOLIC BLOOD PRESSURE: 62 MMHG | SYSTOLIC BLOOD PRESSURE: 130 MMHG | WEIGHT: 158 LBS | HEIGHT: 62 IN | BODY MASS INDEX: 29.08 KG/M2 | HEART RATE: 54 BPM | OXYGEN SATURATION: 97 %

## 2024-03-19 DIAGNOSIS — I47.10 PAROXYSMAL SVT (SUPRAVENTRICULAR TACHYCARDIA): Primary | ICD-10-CM

## 2024-03-19 NOTE — PROGRESS NOTES
Cardiac Electrophysiology Outpatient Note  [facility] at [facility]    Office Visit     Moriah Davies  [id number]  2024    Primary Care Physician: Provider, No Known    Referred By: No ref. provider found    Subjective     Chief Complaint   Patient presents with    Paroxysmal SVT (supraventricular tachycardia     Problem List:  SVT  Requiring  [place] admission with adenosine conversion with recurrence of SVT within several hours. Sent home on metprolol + diltiazem  TTE 2024: LVEF 56-60%, RVSP 35-45, no VHD  Holter 14d 2024: 43/63/144 bpm, no arrhythmias  Hypothyroidism        History of Present Illness:   Moriah Davies is a 68 y.o. female who presents to my electrophysiology clinic as a referral from PANFILO Blackwood for paroxysmal SVT.  She has had several episodes of SVT that have been quite bothersome to her.  She will feel tachypalpitations that come on relatively suddenly.  Can often last a few hours.  Did eventually go to the hospital for this and was found to have a narrow complex SVT.  She was given adenosine once and tachycardia did terminate, but restarted after about 5 minutes.  She was also started on IV Cardizem.  Eventually this calmed her rhythm down.  She was eventually discharged on metoprolol and Cardizem.  Overall she is feeling better.  Has not had significant episodes recently.  Feels as though her energy is doing better.    Past Medical History:   Diagnosis Date    Abnormal heart rhythm     Heart murmur     Hypothyroid     Post-menopause        Past Surgical History:   Procedure Laterality Date     SECTION      x2    CHOLECYSTECTOMY         Family History   Problem Relation Age of Onset    Transient ischemic attack Mother     Leukemia Father     Hypothyroidism Sister     No Known Problems Brother     No Known Problems Maternal Grandmother     No Known Problems Maternal Grandfather     Diabetes Paternal Grandmother     Alzheimer's disease  "Paternal Grandfather        Social History     Socioeconomic History    Marital status:    Tobacco Use    Smoking status: Never     Passive exposure: Never    Smokeless tobacco: Never   Vaping Use    Vaping status: Never Used   Substance and Sexual Activity    Alcohol use: Never    Drug use: Never    Sexual activity: Defer         Current Outpatient Medications:     dilTIAZem (CARDIZEM) 60 MG tablet, Take 1 tablet by mouth 2 (Two) Times a Day., Disp: 180 tablet, Rfl: 1    levothyroxine (SYNTHROID, LEVOTHROID) 75 MCG tablet, Take 1 tablet by mouth Every Morning., Disp: 30 tablet, Rfl: 1    metoprolol tartrate (LOPRESSOR) 25 MG tablet, Take 1 tablet by mouth Every 12 (Twelve) Hours., Disp: 180 tablet, Rfl: 1    Allergies:   No Known Allergies    Objective   Vital Signs: Blood pressure 130/62, pulse 54, height 157.5 cm (62\"), weight 71.7 kg (158 lb), SpO2 97%.    PHYSICAL EXAM  General appearance: Awake, alert, cooperative  Head: Normocephalic, without obvious abnormality, atraumatic  Neck: No JVD  Lungs: Clear to ascultation bilaterally  Heart: Regular rate and rhythm, no murmurs, 2+ LE pulses, no lower extremity swelling  Skin: Skin color, turgor normal, no rashes or lesions  Neurologic: Grossly normal     Lab Results   Component Value Date    GLUCOSE 138 (H) 02/20/2024    CALCIUM 9.7 02/20/2024     02/20/2024    K 4.2 02/20/2024    CO2 23.8 02/20/2024     02/20/2024    BUN 20 02/20/2024    CREATININE 0.90 02/20/2024    BCR 22.2 02/20/2024    ANIONGAP 11.2 02/20/2024     Lab Results   Component Value Date    WBC 10.10 02/20/2024    HGB 14.1 02/20/2024    HCT 43.4 02/20/2024    MCV 93.9 02/20/2024     02/20/2024     No results found for: \"INR\", \"PROTIME\"  Lab Results   Component Value Date    TSH 4.190 02/16/2024          Results for orders placed during the hospital encounter of 02/16/24    Adult Transthoracic Echo Complete W/ Cont if Necessary Per Protocol    Interpretation Summary    Left " ventricular systolic function is normal. Left ventricular ejection fraction appears to be 56 - 60%.    Left ventricular wall thickness is consistent with mild concentric hypertrophy.    Estimated right ventricular systolic pressure from tricuspid regurgitation is mildly elevated (35-45 mmHg).    Mild pulmonary hypertension is present.    There is no evidence of pericardial effusion.         I personally viewed and interpreted the patient's EKG/Telemetry/lab data    Procedures  ECG shows sinus bradycardia with left axis deviation    Moriah Davies  reports that she has never smoked. She has never been exposed to tobacco smoke. She has never used smokeless tobacco.       Advance Care Planning   Advance Care Planning: ACP discussion was held with the patient during this visit. Patient does not have an advance directive, information provided.     Assessment & Plan    1. Paroxysmal SVT (supraventricular tachycardia)  Patient has a history of paroxysmal SVT.  I reviewed her EKGs from the outside facility.  This seems to show a regular, narrow complex, long RP tachycardia.  Differential diagnosis for this includes atrial tachycardia, atypical AV node reentry, or accessory pathway mated tachycardia.  Given that it reportedly terminated with adenosine, this could suggest that is more of an AV marques dependent type of SVT.  Still cannot rule out atrial tachycardia however.    We discussed the pathophysiology of SVT potential treatment options.  Currently still metoprolol and Cardizem and doing better with this.  She however, would like to try to wean her medicines and see how she does but I think this is quite reasonable.  Since she is only on a short acting dose of Cardizem, will start with decreasing her Cardizem and see how she does with this.  She continues to do well also can try to come off of the metoprolol.     We also discussed the option of an EP study and catheter ablation.  This time but I think would be a  reasonable option if she chooses to pursue this route.  As above, for now she would like to try to decrease her medicines and see how she feels.  Will continue to monitor closely.       Follow Up:  Return in about 6 weeks (around 4/30/2024).      Thank you for allowing me to participate in the care of your patient. Please do not hesitate to contact me with additional questions or concerns.      Patricio Jara M.D.  Cardiac Electrophysiologist  [facility] / [facility]

## 2024-05-10 ENCOUNTER — OFFICE VISIT (OUTPATIENT)
Dept: CARDIOLOGY | Facility: CLINIC | Age: 69
End: 2024-05-10
Payer: COMMERCIAL

## 2024-05-10 VITALS
DIASTOLIC BLOOD PRESSURE: 88 MMHG | SYSTOLIC BLOOD PRESSURE: 156 MMHG | HEART RATE: 78 BPM | BODY MASS INDEX: 30.07 KG/M2 | OXYGEN SATURATION: 98 % | HEIGHT: 62 IN | WEIGHT: 163.4 LBS

## 2024-05-10 DIAGNOSIS — I47.10 PAROXYSMAL SVT (SUPRAVENTRICULAR TACHYCARDIA): Primary | ICD-10-CM

## 2024-05-10 DIAGNOSIS — R03.0 ELEVATED BLOOD PRESSURE READING IN OFFICE WITHOUT DIAGNOSIS OF HYPERTENSION: ICD-10-CM

## 2024-05-10 PROCEDURE — 99213 OFFICE O/P EST LOW 20 MIN: CPT

## 2024-05-10 RX ORDER — CHLORAL HYDRATE 500 MG
1000 CAPSULE ORAL 2 TIMES DAILY WITH MEALS
COMMUNITY

## 2024-05-13 NOTE — TELEPHONE ENCOUNTER

## 2025-07-14 ENCOUNTER — TELEPHONE (OUTPATIENT)
Dept: CARDIOLOGY | Facility: CLINIC | Age: 70
End: 2025-07-14
Payer: COMMERCIAL

## 2025-07-14 NOTE — TELEPHONE ENCOUNTER
Lvm for patient letting them know our [facility] has changed locations as of August 1st 2025. New address is [address] [place] [address]. Appt reminder w/ new address along w/ letter and map has been sent out by mail.